# Patient Record
Sex: MALE | Race: BLACK OR AFRICAN AMERICAN
[De-identification: names, ages, dates, MRNs, and addresses within clinical notes are randomized per-mention and may not be internally consistent; named-entity substitution may affect disease eponyms.]

---

## 2017-10-21 ENCOUNTER — HOSPITAL ENCOUNTER (EMERGENCY)
Dept: HOSPITAL 62 - ER | Age: 62
Discharge: HOME | End: 2017-10-21
Payer: COMMERCIAL

## 2017-10-21 VITALS — SYSTOLIC BLOOD PRESSURE: 120 MMHG | DIASTOLIC BLOOD PRESSURE: 80 MMHG

## 2017-10-21 DIAGNOSIS — I25.10: ICD-10-CM

## 2017-10-21 DIAGNOSIS — Z79.899: ICD-10-CM

## 2017-10-21 DIAGNOSIS — R10.9: Primary | ICD-10-CM

## 2017-10-21 DIAGNOSIS — Z87.891: ICD-10-CM

## 2017-10-21 DIAGNOSIS — R11.0: ICD-10-CM

## 2017-10-21 LAB
ALBUMIN SERPL-MCNC: 4.5 G/DL (ref 3.5–5)
ALP SERPL-CCNC: 78 U/L (ref 38–126)
ALT SERPL-CCNC: 36 U/L (ref 21–72)
ANION GAP SERPL CALC-SCNC: 13 MMOL/L (ref 5–19)
AST SERPL-CCNC: 17 U/L (ref 17–59)
BASOPHILS # BLD AUTO: 0.1 10^3/UL (ref 0–0.2)
BASOPHILS NFR BLD AUTO: 1 % (ref 0–2)
BILIRUB DIRECT SERPL-MCNC: 0.4 MG/DL (ref 0–0.4)
BILIRUB SERPL-MCNC: 0.5 MG/DL (ref 0.2–1.3)
BUN SERPL-MCNC: 11 MG/DL (ref 7–20)
CALCIUM: 10.3 MG/DL (ref 8.4–10.2)
CHLORIDE SERPL-SCNC: 105 MMOL/L (ref 98–107)
CK SERPL-CCNC: 134 U/L (ref 55–170)
CO2 SERPL-SCNC: 27 MMOL/L (ref 22–30)
CREAT SERPL-MCNC: 1.05 MG/DL (ref 0.52–1.25)
EOSINOPHIL # BLD AUTO: 0.1 10^3/UL (ref 0–0.6)
EOSINOPHIL NFR BLD AUTO: 2.1 % (ref 0–6)
ERYTHROCYTE [DISTWIDTH] IN BLOOD BY AUTOMATED COUNT: 15.9 % (ref 11.5–14)
GLUCOSE SERPL-MCNC: 95 MG/DL (ref 75–110)
HCT VFR BLD CALC: 42.1 % (ref 37.9–51)
HGB BLD-MCNC: 14 G/DL (ref 13.5–17)
HGB HCT DIFFERENCE: -0.1
LIPASE SERPL-CCNC: 37 U/L (ref 23–300)
LYMPHOCYTES # BLD AUTO: 2.7 10^3/UL (ref 0.5–4.7)
LYMPHOCYTES NFR BLD AUTO: 39.6 % (ref 13–45)
MCH RBC QN AUTO: 26.5 PG (ref 27–33.4)
MCHC RBC AUTO-ENTMCNC: 33.3 G/DL (ref 32–36)
MCV RBC AUTO: 80 FL (ref 80–97)
MONOCYTES # BLD AUTO: 0.6 10^3/UL (ref 0.1–1.4)
MONOCYTES NFR BLD AUTO: 8.6 % (ref 3–13)
NEUTROPHILS # BLD AUTO: 3.3 10^3/UL (ref 1.7–8.2)
NEUTS SEG NFR BLD AUTO: 48.7 % (ref 42–78)
POTASSIUM SERPL-SCNC: 4.5 MMOL/L (ref 3.6–5)
PROT SERPL-MCNC: 8.1 G/DL (ref 6.3–8.2)
RBC # BLD AUTO: 5.28 10^6/UL (ref 4.35–5.55)
SODIUM SERPL-SCNC: 144.6 MMOL/L (ref 137–145)
WBC # BLD AUTO: 6.8 10^3/UL (ref 4–10.5)

## 2017-10-21 PROCEDURE — 80053 COMPREHEN METABOLIC PANEL: CPT

## 2017-10-21 PROCEDURE — 82550 ASSAY OF CK (CPK): CPT

## 2017-10-21 PROCEDURE — 93005 ELECTROCARDIOGRAM TRACING: CPT

## 2017-10-21 PROCEDURE — 85025 COMPLETE CBC W/AUTO DIFF WBC: CPT

## 2017-10-21 PROCEDURE — 83690 ASSAY OF LIPASE: CPT

## 2017-10-21 PROCEDURE — 36415 COLL VENOUS BLD VENIPUNCTURE: CPT

## 2017-10-21 PROCEDURE — 71010: CPT

## 2017-10-21 PROCEDURE — 99284 EMERGENCY DEPT VISIT MOD MDM: CPT

## 2017-10-21 PROCEDURE — 93010 ELECTROCARDIOGRAM REPORT: CPT

## 2017-10-21 PROCEDURE — S0119 ONDANSETRON 4 MG: HCPCS

## 2017-10-21 PROCEDURE — 84484 ASSAY OF TROPONIN QUANT: CPT

## 2017-10-21 NOTE — RADIOLOGY REPORT (SQ)
EXAM DESCRIPTION:  CHEST SINGLE VIEW



COMPLETED DATE/TIME:  10/21/2017 4:14 pm



REASON FOR STUDY:  nausea, h/o MI felt similar



COMPARISON:  2/16/2008



EXAM PARAMETERS:  NUMBER OF VIEWS: One view.

TECHNIQUE: Single frontal radiographic view of the chest acquired.

RADIATION DOSE: NA

LIMITATIONS: None.



FINDINGS:  LUNGS AND PLEURA: No opacities, masses or pneumothorax. No pleural effusion.

MEDIASTINUM AND HILAR STRUCTURES: No masses.  Contour normal.

HEART AND VASCULAR STRUCTURES: Heart normal in size.  Normal vasculature.

BONES: No acute findings.

HARDWARE: Median sternotomy wires noted and intact.  Prior CABG.

OTHER: No other significant finding.



IMPRESSION:  NO ACUTE RADIOGRAPHIC FINDING IN THE CHEST.



TECHNICAL DOCUMENTATION:  JOB ID:  [id number]

## 2017-10-21 NOTE — ER DOCUMENT REPORT
ED Medical Screen (RME)





- General


TRAVEL OUTSIDE OF THE [place] IN LAST 30 DAYS: No





- General


Chief Complaint: Nausea


Stated Complaint: ABDOMINAL ISSUES


Time Seen by Provider: 10/21/17 15:30


Notes: 





Patient is a 62 year old male presenting to the emergency department for nausea 

and dizziness. Patient states he felt like this previously when he had an MI. 

Patient denies any chest pain, difficulty breathing, or vomiting. Patient 

states he had a triple bypass x 10 years ago and his last catheterization was 1 

year ago.  Patient's cardiologist is Dr. Pacheco. (BING SILVA)





- Related Data


Allergies/Adverse Reactions: 


 





No Known Allergies Allergy (Verified 10/21/17 15:06)


 








Home Medications: 


 Current Home Medications





Aspirin [Aspirin EC] 81 mg PO DAILY 10/21/17 [History]


Carvedilol [Coreg] 1 tab PO Q12 10/21/17 [History]


Lisinopril [Prinivil 40 mg Tablet] 20 mg PO DAILY 10/21/17 [History]


Simvastatin [Simvastatin] 40 mg PO DAILY 10/21/17 [History]


Zolpidem Tartrate 10 mg PO QHS PRN 10/21/17 [History]











Past Medical History





- Social History


Chew tobacco use (# tins/day): No


Frequency of alcohol use: None


Drug Abuse: None





- Past Medical History


Cardiac Medical History: Reports: Hx Heart Attack, Hx Hypercholesterolemia, Hx 

Hypertension


Renal/ Medical History: Denies: Hx Peritoneal Dialysis


Past Surgical History: Reports: Hx Coronary Artery Bypass Graft, Hx Open Heart 

Surgery - CABG x 3





- Immunizations


Hx Diphtheria, Pertussis, Tetanus Vaccination: No


History of Influenza Vaccine for 10/2017 - 3/2018 Season: No





Physical Exam





- Vital signs


Vitals: 





 











Temp Pulse Resp BP Pulse Ox


 


 98.6 F   65   20   126/85 H  98 


 


 10/21/17 15:06  10/21/17 15:06  10/21/17 15:06  10/21/17 15:06  10/21/17 15:06














- Notes


Notes: 





GENERAL: Alert, interacts well. No acute distress.


LUNGS: Clear to auscultation bilaterally, no wheezes, rales, or rhonchi. No 

respiratory distress.


HEART: Regular rate and rhythm. No murmurs, gallops, or rubs.


 (BING SILVA)





- Vital Signs


Vital signs: 





 











Temp Pulse Resp BP Pulse Ox


 


 98.6 F   65   20   126/85 H  98 


 


 10/21/17 15:06  10/21/17 15:06  10/21/17 15:06  10/21/17 15:06  10/21/17 15:06














Scribe Documentation





- Scribe


Written by Sudhaibe:: Josie Elizondo 10/21/2017 15:47


acting as scribe for :: [place]

## 2017-10-21 NOTE — ER DOCUMENT REPORT
ED GI/





- General


Chief Complaint: Nausea


Stated Complaint: ABDOMINAL ISSUES


Time Seen by Provider: 10/21/17 15:30


Notes: 


The patient is a 62-year-old male, past medical history CAD status post triple 

bypass 10 years ago, presents with episode of upper abdominal cramping and 

slight nausea that felt like indigestion.  He drank ginger ale and took a Tums 

with relief of his symptoms.  He is concerned because 10 years ago, he had 

similar symptoms and was told he had a heart attack. He is currently completely 

asymptomatic and denies chest pain, shortness of breath, nausea, vomiting, 

current abdominal pain, fevers, diarrhea, constipation or urinary symptoms.


TRAVEL OUTSIDE OF THE [place] IN LAST 30 DAYS: No





- Related Data


Allergies/Adverse Reactions: 


 





No Known Allergies Allergy (Verified 10/21/17 15:06)


 








Home Medications: 


 Current Home Medications





Aspirin [Aspirin EC] 81 mg PO DAILY 10/21/17 [History]


Carvedilol [Coreg] 1 tab PO Q12 10/21/17 [History]


Lisinopril [Prinivil 40 mg Tablet] 20 mg PO DAILY 10/21/17 [History]


Simvastatin [Simvastatin] 40 mg PO DAILY 10/21/17 [History]


Zolpidem Tartrate 10 mg PO QHS PRN 10/21/17 [History]











Past Medical History





- General


Information source: Patient





- Social History


Smoking Status: Former Smoker


Chew tobacco use (# tins/day): No


Frequency of alcohol use: None


Drug Abuse: None


Family History: Reviewed & Not Pertinent





- Past Medical History


Cardiac Medical History: Reports: Hx Heart Attack, Hx Hypercholesterolemia, Hx 

Hypertension


Renal/ Medical History: Denies: Hx Peritoneal Dialysis


Past Surgical History: Reports: Hx Coronary Artery Bypass Graft, Hx Open Heart 

Surgery - CABG x 3





- Immunizations


Hx Diphtheria, Pertussis, Tetanus Vaccination: No





Review of Systems





- Review of Systems


Notes: 


REVIEW OF SYSTEMS:


CONSTITUTIONAL: -fevers, -chills


EENT: -eye pain, -difficulty swallowing, -nasal congestion


CARDIOVASCULAR:-chest pain, -syncope.


RESPIRATORY: -cough, -SOB


GASTROINTESTINAL: +abdominal pain, +nausea, -vomiting, -diarrhea


GENITOURINARY: -dysuria, -hematuria


MUSCULOSKELETAL: -back pain, -neck pain


SKIN: -rash or skin lesions.


HEMATOLOGIC: -easy bruising or bleeding.


LYMPHATIC: -swollen, enlarged glands.


NEUROLOGICAL: -altered mental status or loss of consciousness, -headache, -

neurologic symptoms


PSYCHIATRIC: -anxiety, -depression.


ALL OTHER SYSTEMS REVIEWED AND NEGATIVE.





Physical Exam





- Vital signs


Vitals: 


 











Temp Pulse Resp BP Pulse Ox


 


 98.6 F   65   20   126/85 H  98 


 


 10/21/17 15:06  10/21/17 15:06  10/21/17 15:06  10/21/17 15:06  10/21/17 15:06














- Notes


Notes: 


PHYSICAL EXAMINATION:





GENERAL: Well-appearing, well-nourished and in no acute distress.





HEAD: Atraumatic, normocephalic.





EYES: Pupils equal round and reactive to light, extraocular movements intact, 

sclera anicteric, conjunctiva are normal.





ENT: nares patent, oropharynx clear without exudates.  Moist mucous membranes.





NECK: Normal range of motion, supple without lymphadenopathy





LUNGS: Breath sounds clear to auscultation bilaterally and equal.  No wheezes 

rales or rhonchi.





HEART: Regular rate and rhythm without murmurs





ABDOMEN: Soft, nontender, normoactive bowel sounds.  No guarding, no rebound.  

No masses appreciated.





EXTREMITIES: Normal range of motion, no pitting or edema.  No cyanosis.





NEUROLOGICAL: Cranial nerves grossly intact.  Normal speech, normal gait.  

Normal sensory and motor exams.





PSYCH: Normal mood, normal affect.





SKIN: Warm, Dry, normal turgor, no rashes or lesions noted.





Course





- Re-evaluation


Re-evalutation: 


Patient appears very well.  He has absolutely no abdominal tenderness and his 

EKG and labs are completely unremarkable.  There is no sign of active ischemia 

and he is drinking without any nausea or vomiting.  Instructed patient to follow

-up with his primary care physician for further evaluation and treatment.





- Vital Signs


Vital signs: 


 











Temp Pulse Resp BP Pulse Ox


 


 98.6 F   65   15   119/80   97 


 


 10/21/17 15:06  10/21/17 15:06  10/21/17 18:01  10/21/17 18:01  10/21/17 18:01














- Laboratory


Result Diagrams: 


 10/21/17 17:40





 10/21/17 17:40


Laboratory results interpreted by me: 


 











  10/21/17 10/21/17





  17:40 17:40


 


MCH  26.5 L 


 


RDW  15.9 H 


 


Calcium   10.3 H














- EKG Interpretation by Me


EKG shows normal: Sinus rhythm, Axis, Intervals, QRS Complexes, ST-T Waves


Rate: Normal


Axis/QRS: RBBB, LAHB/LAFB


When compared to previous EKG there are: No significant change





Discharge





- Discharge


Clinical Impression: 


 Abdominal cramping





Condition: Stable


Disposition: HOME, SELF-CARE


Additional Instructions: 


ABDOMINAL PAIN:





     There are many causes of abdominal pain.  Pain can mean a serious problem 

requiring surgery (such as appendicitis). It can also be an innocent problem 

that goes away on its own (such as a viral infection).  Often, time must pass 

to determine the cause of pain.


     The physician does not feel that hospitalization is necessary, at present. 

Things may change within the next 24 hours. Call the doctor or come back for re-

examination if any problems occur, such as:


     (1) Pain that becomes more severe, steady, or becomes concentrated in one 

specific area.  Also, pain that is more severe with movement or coughing.  


     (2) Vomiting that persists or becomes more frequent.  


     (3) Blood in the vomitus, urine, or bowel movements.  Blood in the stool 

may have a tarry or black appearance.


     (4) Shaking chills or fever greater than 100 degrees F. 


     (5) The abdomen becomes more distended or swollen. 


     (6) Bowel movements cease. 


     (7) Failure to improve as expected.








NORMAL EXAM AND WORKUP:


     At this time, your examination and workup show no significant abnormality.

  No significant abnormal physical findings are noted.  All laboratory, EKG, 

and imaging (x-ray, CT scans, ultrasound) studies that were ordered show no 

significant abnormality.


     Although your examination and all studies that were ordered showed no 

significant abnormal finding, there are no examinations and no studies that are 

100% accurate.  There is always the possibility that some abnormality could 

exist and not be detected with physical examination or within the limits and 

capabilities of laboratory and other studies.


     You should return or follow up as you were instructed on your visit today 

for further evaluation if your symptoms do not resolve.





FOLLOW-UP CARE:


If you have been referred to a physician for follow-up care, call the physician

s office for an appointment as you were instructed or within the next two days.

  If you experience worsening or a significant change in your symptoms, notify 

the physician immediately or return to the Emergency Department at any time for 

re-evaluation.





CHEST PAIN OF UNCLEAR CAUSE:





     The exact cause of your chest pain isn't clear.  Fortunately, there is no 

evidence of a dangerous medical condition.  Further testing may be required to 

find the source of the pain.


     Most often, we find that this pain is coming from the chest wall -- the 

muscles or rib joints in the chest.  But chest pain can come from the lung and 

lung lining, the esophagus, the heart valves or heart lining, and even the 

stomach or gallbladder.


     Rest.  Eat lightly until the pain is gone.  We may prescribe medicine for 

pain and inflammation.


     You should call the physician immediately if the pain radiates to the 

shoulder, jaw or arms; if you start to run a fever or develop a cough; or if 

you develop shortness of breath, or other new or alarming symptoms.








NORMAL EXAM AND WORKUP:


     At this time, your examination and workup show no significant abnormality.

  No significant abnormal physical findings were noted.  All laboratory, EKG, 

and imaging (x-ray, CT scans, ultrasound) studies that were ordered show no 

significant abnormality.


     Although your examination and all studies that were ordered showed no 

significant abnormal finding, there are no examinations and no studies that are 

100% accurate.  There is always the possibility that some abnormality could 

exist and not be detected with physical examination or within the limits and 

capabilities of laboratory and other studies.


     You should return or follow up as you were instructed on your visit today 

for further evaluation if your symptoms do not resolve.








CHEST WALL PAIN:


     Your chest pain may be coming from the chest wall. This is often caused by 

straining the muscles or joints in the chest during physical activity, direct 

trauma, coughing, or vigorous vomiting.  Persons with arthritis are especially 

prone to this type of pain, due to inflammation of the cartilage joints near 

the breast bone. Occasionally, no cause can be found.


     Rest from strenuous physical activity.  This kind of chest pain is usually 

made worse by movement of the chest.  Depending on the symptoms, we may 

prescribe medicine for pain, muscle relaxation, and antiinflammatory effects.


     If the pain is new, and seems to be due to muscle strain, cold packs can 

help.  Otherwise, apply gentle warmth to the painful area for 15 minutes every 

hour or two.


     You should call contact the doctor immediately if things change. Further 

evaluation is needed if you develop a fever or cough, if the nature of the pain 

changes, or if you become short of breath.








ANGINA EPISODE:


     Your physician has diagnosed the pain you experienced as an episode of 

angina.  Angina occurs when a portion of the heart muscle temporarily lacks 

oxygen.  It does not cause any permanent heart damage, but serves as a warning.


     Hospitalization is not necessary now.  Evaluation of your cardiac condition

, and medical therapy for angina will be necessary.  It's important you be sure 

to keep all appointments and take medication exactly as prescribed.


     Angina is usually treated with a type of "nitrate" medication. This is 

available as ointment, pills, or sublingual (under the tongue) tablets.  

Depending on your clinical situation, other medications may be added to help 

control angina.  These may include beta blockers or calcium blockers.


     If episodes of angina are occurring with increased frequency, or if chest 

pain lasts longer than 15 minutes or does not respond to nitroglycerin, you 

must seek emergency medical care immediately.








ACID REFLUX DISEASE (GERD):


     Gastro-Esophageal Reflux Disease (GERD) is caused by stomach acid 

refluxing back up into the esophagus. The valve at the end of the esophagus may 

be weak. This is common in persons with a hiatal hernia. GERD symptoms can 

include indigestion, chest pain, heartburn, or food "sticking." Certain foods, 

alcohol, and aspirin can make GERD worse.


     Treatment depends on the severity. Usually, antacids or acid-suppressing 

medicines are used. When the esophagus is acutely inflamed, the physician will 

often prescribe membrane-protective drugs such as Carafate.  Some patients 

benefit from medication such as Reglan that tightens the valve at the top of 

the stomach.


     Avoid those foods that bring on your symptoms. For many people, these 

foods are coffee, chocolate, onions, garlic, and carbonated drinks. Don't use 

alcohol, aspirin, caffeine, or tobacco. Don't eat late at night -- within 4 

hours of bedtime. Don't over-eat. If necessary, elevate the head of your bed 

about 4 inches so that stomach acid will not roll up into your esophagus.


     Call the doctor if you develop severe chest pain, inability to swallow 

fluids, fever, or worsening symptoms.








ANTACID THERAPY:


     You have been instructed to start antacid therapy.  Antacids directly 

neutralize stomach acid.  This is useful for acid irritation of the esophagus, 

gastritis, and ulcers.


     You should take two tablespoons of antacid one hour after each meal and 

three hours after each meal.  If you are not eating, take the antacid every two 

hours.  If you are using a concentrate (such as Maalox TC), use only one 

tablespoon.


     Many antacids affect the bowels.  The most common problem is diarrhea.  In 

this case, a pure aluminum hydroxide antacid (such as AlternaGel) can be 

substituted for some or all doses.  If the problem is constipation, add a 

teaspoon of Milk of Magnesia to each dose.


     Call the doctor if you experience continued diarrhea or constipation, or 

if you develop lightheadedness, bloody stool or vomitus, severe abdominal pain, 

or black stool.





FOLLOW-UP CARE:


If you have been referred to a physician for follow-up care, call the physician

s office for an appointment as you were instructed or within the next two days.

  If you experience worsening or a significant change in your symptoms, notify 

the physician immediately or return to the Emergency Department at any time for 

re-evaluation.

## 2017-10-22 NOTE — EKG REPORT
SEVERITY:- ABNORMAL ECG -

SINUS RHYTHM

VENTRICULAR PREMATURE COMPLEX

RBBB AND LPFB

INFERIOR INFARCT, AGE INDETERMINATE

:

Confirmed by: Luz Hess MD 22-Oct-2017 09:19:22

## 2018-01-12 ENCOUNTER — HOSPITAL ENCOUNTER (OUTPATIENT)
Dept: HOSPITAL 62 - RAD | Age: 63
End: 2018-01-12
Attending: PHYSICIAN ASSISTANT
Payer: COMMERCIAL

## 2018-01-12 DIAGNOSIS — Z95.1: ICD-10-CM

## 2018-01-12 DIAGNOSIS — Z72.0: ICD-10-CM

## 2018-01-12 DIAGNOSIS — Z12.2: Primary | ICD-10-CM

## 2018-01-12 PROCEDURE — G0297 LDCT FOR LUNG CA SCREEN: HCPCS

## 2018-01-12 NOTE — RADIOLOGY REPORT (SQ)
EXAM DESCRIPTION:  CT LUNG CANCER SCREENING



COMPLETED DATE/TIME:  1/12/2018 8:43 am



REASON FOR STUDY:  Z72.0 TOBACCO USE Z72.0  TOBACCO USE

Has the patient had a Chest CT scan within the past year? No

Was the patient offered tobacco cessation counseling? Yes

Was the patient engaged in shared decision making for this test? Yes

Does the patient have signs or symptoms of Lung Cancer? No

Is the patient a smoker? No

How many packs per year? 365

How many years since quitting smoking? More than 5 years

Patients age: 62



COMPARISON:  None.



TECHNIQUE:  Low Dose CT scan performed of the chest without intravenous contrast for purposes of scre
ening for lung cancer.  Images reviewed with lung, soft tissue and bone windows.  Reconstructed coron
al and sagittal MPR images reviewed.  All images stored on PACS.

All CT scanners at this facility use dose modulation, iterative reconstruction, and/or weight based d
osing when appropriate to reduce radiation dose to as low as reasonably achievable (ALARA).

[facility]: Dose Right  [facility]: CareDose    [facility]: Dose Right    [facility]: Teradose 4D    [facility]: Smart Technologies



RADIATION DOSE:  2.8  mGy.

 .



LIMITATIONS:  None



FINDINGS:  LUNGS AND PLEURA: No masses or nodules.    No pleural effusions or calcifications.  No pne
umothorax.   No scarring or interstitial changes.

HILAR AND MEDIASTINAL STRUCTURES: No identified masses. No abnormal nodes.

HEART AND VASCULAR STRUCTURES: No aortic aneurysm.  No pericardial effusion.   No cardiac devices.

CORONARY ARTERY CALCIFICATIONS: No significant calcifications.  Patient is post CABG

UPPER ABDOMEN, THYROID, BONES, OTHER SOFT TISSUES: No significant findings.



IMPRESSION:  NO SIGNIFICANT FINDING IN THE LUNGS ON NON-CONTRASTED CHEST CT.

NO OTHER CLINICALLY SIGNIFICANT/POTENTIALLY CLINICALLY SIGNIFICANT FINDINGS



LUNGRADS:  LUNGRADS: 1 NEGATIVE.  NO NODULES, OR DEFINITELY BENIGN NODULES

MODIFIER: NONE



RECOMMENDATION:  Continue annual screening with LDCT in 12 months.



COMMENT:  CRITERIA:

No lung nodules.

Nodules with specific calcifications:  Complete, central, popcorn, concentric rings and fat containin
g nodules.



TECHNICAL DOCUMENTATION:  JOB ID:  [id number]

Quality ID # [id number]: Final reports with documentation of one or more dose reduction techniques (e.g., Au
tomated exposure control, adjustment of the mA and/or kV according to patient size, use of iterative 
reconstruction technique)

 2011 [facility]

## 2019-10-09 ENCOUNTER — HOSPITAL ENCOUNTER (OUTPATIENT)
Dept: HOSPITAL 62 - OD | Age: 64
End: 2019-10-09
Attending: FAMILY MEDICINE
Payer: COMMERCIAL

## 2019-10-09 DIAGNOSIS — E87.5: Primary | ICD-10-CM

## 2019-10-09 LAB
ANION GAP SERPL CALC-SCNC: 7 MMOL/L (ref 5–19)
BUN SERPL-MCNC: 10 MG/DL (ref 7–20)
CALCIUM: 9.4 MG/DL (ref 8.4–10.2)
CHLORIDE SERPL-SCNC: 105 MMOL/L (ref 98–107)
CO2 SERPL-SCNC: 30 MMOL/L (ref 22–30)
GLUCOSE SERPL-MCNC: 99 MG/DL (ref 75–110)
POTASSIUM SERPL-SCNC: 4.8 MMOL/L (ref 3.6–5)

## 2019-10-09 PROCEDURE — 80048 BASIC METABOLIC PNL TOTAL CA: CPT

## 2019-10-09 PROCEDURE — 36415 COLL VENOUS BLD VENIPUNCTURE: CPT

## 2019-12-17 ENCOUNTER — HOSPITAL ENCOUNTER (EMERGENCY)
Dept: HOSPITAL 62 - ER | Age: 64
Discharge: LEFT BEFORE DISCHARGE | End: 2019-12-17
Payer: MEDICARE

## 2019-12-17 VITALS — DIASTOLIC BLOOD PRESSURE: 84 MMHG | SYSTOLIC BLOOD PRESSURE: 150 MMHG

## 2019-12-17 DIAGNOSIS — Z53.21: Primary | ICD-10-CM

## 2019-12-17 PROCEDURE — 93010 ELECTROCARDIOGRAM REPORT: CPT

## 2019-12-17 PROCEDURE — 93005 ELECTROCARDIOGRAM TRACING: CPT

## 2019-12-17 NOTE — EKG REPORT
SEVERITY:- ABNORMAL ECG -

SINUS RHYTHM

RBBB AND LPFB

INFERIOR INFARCT, AGE INDETERMINATE

:

Confirmed by: Denisse Scales 17-Dec-2019 14:23:19

## 2019-12-31 ENCOUNTER — HOSPITAL ENCOUNTER (OUTPATIENT)
Dept: HOSPITAL 62 - ER | Age: 64
Setting detail: OBSERVATION
LOS: 3 days | Discharge: HOME | End: 2020-01-03
Attending: INTERNAL MEDICINE | Admitting: FAMILY MEDICINE
Payer: MEDICARE

## 2019-12-31 DIAGNOSIS — K21.9: ICD-10-CM

## 2019-12-31 DIAGNOSIS — Z95.1: ICD-10-CM

## 2019-12-31 DIAGNOSIS — Z87.891: ICD-10-CM

## 2019-12-31 DIAGNOSIS — I10: ICD-10-CM

## 2019-12-31 DIAGNOSIS — I25.2: ICD-10-CM

## 2019-12-31 DIAGNOSIS — Z79.899: ICD-10-CM

## 2019-12-31 DIAGNOSIS — R79.89: ICD-10-CM

## 2019-12-31 DIAGNOSIS — Z79.82: ICD-10-CM

## 2019-12-31 DIAGNOSIS — R07.9: ICD-10-CM

## 2019-12-31 DIAGNOSIS — R10.13: Primary | ICD-10-CM

## 2019-12-31 DIAGNOSIS — E78.5: ICD-10-CM

## 2019-12-31 DIAGNOSIS — E66.9: ICD-10-CM

## 2019-12-31 DIAGNOSIS — R01.1: ICD-10-CM

## 2019-12-31 DIAGNOSIS — I25.10: ICD-10-CM

## 2019-12-31 LAB
ADD MANUAL DIFF: NO
ALBUMIN SERPL-MCNC: 3.6 G/DL (ref 3.5–5)
ALP SERPL-CCNC: 78 U/L (ref 38–126)
ANION GAP SERPL CALC-SCNC: 7 MMOL/L (ref 5–19)
AST SERPL-CCNC: 18 U/L (ref 17–59)
BASOPHILS # BLD AUTO: 0.1 10^3/UL (ref 0–0.2)
BASOPHILS NFR BLD AUTO: 0.7 % (ref 0–2)
BILIRUB DIRECT SERPL-MCNC: 0.3 MG/DL (ref 0–0.4)
BILIRUB SERPL-MCNC: 0.3 MG/DL (ref 0.2–1.3)
BUN SERPL-MCNC: 12 MG/DL (ref 7–20)
CALCIUM: 9 MG/DL (ref 8.4–10.2)
CHLORIDE SERPL-SCNC: 102 MMOL/L (ref 98–107)
CK SERPL-CCNC: 114 U/L (ref 55–170)
CO2 SERPL-SCNC: 35 MMOL/L (ref 22–30)
EOSINOPHIL # BLD AUTO: 0.1 10^3/UL (ref 0–0.6)
EOSINOPHIL NFR BLD AUTO: 1.5 % (ref 0–6)
ERYTHROCYTE [DISTWIDTH] IN BLOOD BY AUTOMATED COUNT: 16.6 % (ref 11.5–14)
GLUCOSE SERPL-MCNC: 146 MG/DL (ref 75–110)
HCT VFR BLD CALC: 37.2 % (ref 37.9–51)
HGB BLD-MCNC: 12.1 G/DL (ref 13.5–17)
LYMPHOCYTES # BLD AUTO: 2.6 10^3/UL (ref 0.5–4.7)
LYMPHOCYTES NFR BLD AUTO: 28.3 % (ref 13–45)
MCH RBC QN AUTO: 25.8 PG (ref 27–33.4)
MCHC RBC AUTO-ENTMCNC: 32.6 G/DL (ref 32–36)
MCV RBC AUTO: 79 FL (ref 80–97)
MONOCYTES # BLD AUTO: 0.8 10^3/UL (ref 0.1–1.4)
MONOCYTES NFR BLD AUTO: 8.1 % (ref 3–13)
NEUTROPHILS # BLD AUTO: 5.7 10^3/UL (ref 1.7–8.2)
NEUTS SEG NFR BLD AUTO: 61.4 % (ref 42–78)
PLATELET # BLD: 202 10^3/UL (ref 150–450)
POTASSIUM SERPL-SCNC: 3.8 MMOL/L (ref 3.6–5)
PROT SERPL-MCNC: 6.9 G/DL (ref 6.3–8.2)
RBC # BLD AUTO: 4.71 10^6/UL (ref 4.35–5.55)
TOTAL CELLS COUNTED % (AUTO): 100 %
WBC # BLD AUTO: 9.3 10^3/UL (ref 4–10.5)

## 2019-12-31 PROCEDURE — 80076 HEPATIC FUNCTION PANEL: CPT

## 2019-12-31 PROCEDURE — 85025 COMPLETE CBC W/AUTO DIFF WBC: CPT

## 2019-12-31 PROCEDURE — 36415 COLL VENOUS BLD VENIPUNCTURE: CPT

## 2019-12-31 PROCEDURE — 80048 BASIC METABOLIC PNL TOTAL CA: CPT

## 2019-12-31 PROCEDURE — G0378 HOSPITAL OBSERVATION PER HR: HCPCS

## 2019-12-31 PROCEDURE — 82553 CREATINE MB FRACTION: CPT

## 2019-12-31 PROCEDURE — 84484 ASSAY OF TROPONIN QUANT: CPT

## 2019-12-31 PROCEDURE — 74177 CT ABD & PELVIS W/CONTRAST: CPT

## 2019-12-31 PROCEDURE — 99285 EMERGENCY DEPT VISIT HI MDM: CPT

## 2019-12-31 PROCEDURE — 71045 X-RAY EXAM CHEST 1 VIEW: CPT

## 2019-12-31 PROCEDURE — 82550 ASSAY OF CK (CPK): CPT

## 2019-12-31 PROCEDURE — 93005 ELECTROCARDIOGRAM TRACING: CPT

## 2019-12-31 PROCEDURE — 76700 US EXAM ABDOM COMPLETE: CPT

## 2019-12-31 PROCEDURE — 80053 COMPREHEN METABOLIC PANEL: CPT

## 2019-12-31 PROCEDURE — 93010 ELECTROCARDIOGRAM REPORT: CPT

## 2019-12-31 NOTE — RADIOLOGY REPORT (SQ)
XR CHEST 1 VIEW



EXAM DATE: 12/31/2019 10:52 PM CST



HISTORY: Epigastric pain; hx of MI.



COMPARISON: 10/21/2017



FINDINGS:



Stable heart size with prior CABG noted. No pulmonary edema. The

lungs are clear. No pleural effusions or pneumothorax. No acute

bony findings are seen.



IMPRESSION:



No evidence of acute cardiopulmonary disease.

## 2019-12-31 NOTE — ER DOCUMENT REPORT
ED Medical Screen (RME)





- General


Chief Complaint: Epigastric Pain


Stated Complaint: EPIGASTRIC PAIN


Time Seen by Provider: 12/31/19 22:47


Primary Care Provider: 


JENNYFER MENESES MD [Primary Care Provider] - Follow up as needed


Notes: 





Patient is a 64-year-old male who presents to the emergency department with a 

chief complaint of epigastric pain.  Patient has history of of an MI in the 

past.  His symptoms started around 2100 this evening.  He makes some baking soda

and water and it helped with the symptoms.





Exam: S1, S2.





I have greeted and performed a rapid initial assessment of this patient.  A 

comprehensive ED assessment and evaluation of the patient, analysis of test 

results and completion of medical decision making process will be conducted by 

an additional ED providers.


TRAVEL OUTSIDE OF THE [place] IN LAST 30 DAYS: No





- Related Data


Allergies/Adverse Reactions: 


                                        





No Known Allergies Allergy (Verified 10/21/17 15:06)


   








Home Medications: omeprazole 20 mg qam.  lisinopril 20 mg qday.  ezetimibe 10 mg

qday.  atoravastatin 40 mg po qday.  coreg 25 mg po qday.  asa 81 mg po qday





Past Medical History





- Past Medical History


Cardiac Medical History: Reports: Hx Heart Attack, Hx Hypercholesterolemia, Hx 

Hypertension


Renal/ Medical History: Denies: Hx Peritoneal Dialysis


Past Surgical History: Reports: Hx Coronary Artery Bypass Graft, Hx Open Heart 

Surgery - CABG x 3





- Immunizations


Hx Diphtheria, Pertussis, Tetanus Vaccination: No





Physical Exam





- Vital signs


Vitals: 





                                        











Temp Pulse Resp BP Pulse Ox


 


 98.3 F   62   18   138/69 H  96 


 


 12/31/19 22:08  12/31/19 22:08  12/31/19 22:08  12/31/19 22:08  12/31/19 22:08














Course





- Vital Signs


Vital signs: 





                                        











Temp Pulse Resp BP Pulse Ox


 


 98.3 F   62   18   138/69 H  96 


 


 12/31/19 22:08  12/31/19 22:08  12/31/19 22:08  12/31/19 22:08  12/31/19 22:08














Doctor's Discharge





- Discharge


Referrals: 


JENNYFER MENESES MD [Primary Care Provider] - Follow up as needed

## 2020-01-01 LAB
ALBUMIN SERPL-MCNC: 3.6 G/DL (ref 3.5–5)
ALP SERPL-CCNC: 80 U/L (ref 38–126)
ANION GAP SERPL CALC-SCNC: 5 MMOL/L (ref 5–19)
AST SERPL-CCNC: 17 U/L (ref 17–59)
BILIRUB DIRECT SERPL-MCNC: 0.2 MG/DL (ref 0–0.4)
BILIRUB SERPL-MCNC: 0.3 MG/DL (ref 0.2–1.3)
BUN SERPL-MCNC: 11 MG/DL (ref 7–20)
CALCIUM: 9.1 MG/DL (ref 8.4–10.2)
CHLORIDE SERPL-SCNC: 106 MMOL/L (ref 98–107)
CK MB SERPL-MCNC: 1.44 NG/ML (ref ?–4.55)
CK MB SERPL-MCNC: 1.5 NG/ML (ref ?–4.55)
CO2 SERPL-SCNC: 33 MMOL/L (ref 22–30)
GLUCOSE SERPL-MCNC: 99 MG/DL (ref 75–110)
POTASSIUM SERPL-SCNC: 4.1 MMOL/L (ref 3.6–5)
PROT SERPL-MCNC: 6.8 G/DL (ref 6.3–8.2)
TROPONIN I SERPL-MCNC: 0.05 NG/ML
TROPONIN I SERPL-MCNC: 0.05 NG/ML

## 2020-01-01 RX ADMIN — ENOXAPARIN SODIUM SCH MG: 40 INJECTION SUBCUTANEOUS at 13:16

## 2020-01-01 RX ADMIN — LISINOPRIL SCH MG: 10 TABLET ORAL at 16:30

## 2020-01-01 RX ADMIN — CARVEDILOL SCH MG: 12.5 TABLET, FILM COATED ORAL at 21:52

## 2020-01-01 RX ADMIN — PANTOPRAZOLE SODIUM SCH MG: 40 TABLET, DELAYED RELEASE ORAL at 13:16

## 2020-01-01 RX ADMIN — ASPIRIN SCH MG: 81 TABLET, COATED ORAL at 13:16

## 2020-01-01 RX ADMIN — ATORVASTATIN CALCIUM SCH MG: 80 TABLET, FILM COATED ORAL at 21:52

## 2020-01-01 NOTE — EKG REPORT
SEVERITY:- ABNORMAL ECG -

SINUS RHYTHM

RBBB AND LPFB

INFERIOR INFARCT, OLD SINCE [id number].

:

Confirmed by: Greg Ying MD 01-Jan-2020 09:10:24

## 2020-01-01 NOTE — ER DOCUMENT REPORT
ED General





- General


Chief Complaint: Epigastric Pain


Stated Complaint: EPIGASTRIC PAIN


Time Seen by Provider: 12/31/19 22:47


Mode of Arrival: Ambulatory


Information source: Patient


Notes: 





64-year-old male presented to ED for complaint of epigastric/chest pain.  He 

states he was in his lower chest upper abdomen.  He states he has a history of 

MI with a CABG her blood pressure and cholesterol and reflux.  He states his 

pain started about 9:00 and he ate last about 6:00.  He states he took some 

baking soda water and it helps some.  He states he was given aspirin when he 

came to the emergency room.  He states he was concerned because he does have a 

cardiac history and is got another cardiac appointment in January.  He states he

is not having any pain at this time.  He states he had a similar pain about a 

week ago and he became concerned because this is the second time he had this 

pain.  Patient is alert oriented respirations regular nonlabored speaking in 

full sentences.  Patient has a negative troponin earlier and I will get the 

second troponin if it is negative I will send him home is he is no longer having

any pain or discomfort.


TRAVEL OUTSIDE OF THE [place] IN LAST 30 DAYS: No





- HPI


Onset: This evening


Onset/Duration: Sudden, Gone


Quality of pain: No pain


Severity: None


Pain Level: Denies


Associated symptoms: Chest pain - /Epigastric pain


Exacerbated by: Denies


Relieved by: Denies


Similar symptoms previously: Yes


Recently seen / treated by doctor: No





- Related Data


Allergies/Adverse Reactions: 


                                        





No Known Allergies Allergy (Verified 10/21/17 15:06)


   








Home Medications: omeprazole 20 mg qam.  lisinopril 20 mg qday.  ezetimibe 10 mg

qday.  atoravastatin 40 mg po qday.  coreg 25 mg po qday.  asa 81 mg po qday





Past Medical History





- General


Information source: Patient





- Social History


Smoking Status: Former Smoker


Cigarette use (# per day): No


Frequency of alcohol use: None


Drug Abuse: None


Family History: Reviewed & Not Pertinent


Patient has suicidal ideation: No


Patient has homicidal ideation: No





- Past Medical History


Cardiac Medical History: Reports: Hx Heart Attack, Hx Hypercholesterolemia, Hx 

Hypertension


Pulmonary Medical History: Reports: None


EENT Medical History: Reports: None


Neurological Medical History: Reports: None


Endocrine Medical History: Reports: Other - boderline dm


Renal/ Medical History: Reports: None


Malignancy Medical History: Reports None


GI Medical History: Reports: Hx Gastroesophageal Reflux Disease


Musculoskeletal Medical History: Reports None


Skin Medical History: Reports None


Psychiatric Medical History: Reports: None


Traumatic Medical History: Reports: None


Infectious Medical History: Reports: None


Past Surgical History: Reports: Hx Coronary Artery Bypass Graft, Hx Open Heart 

Surgery - CABG x 3





- Immunizations


Hx Diphtheria, Pertussis, Tetanus Vaccination: No





Review of Systems





- Review of Systems


Constitutional: No symptoms reported


EENT: No symptoms reported


Cardiovascular: Chest pain


Respiratory: No symptoms reported


Gastrointestinal: No symptoms reported


Genitourinary: No symptoms reported


Male Genitourinary: No symptoms reported


Musculoskeletal: No symptoms reported


Skin: No symptoms reported


Hematologic/Lymphatic: No symptoms reported


Neurological/Psychological: No symptoms reported





Physical Exam





- Vital signs


Vitals: 


                                        











Temp Pulse Resp BP Pulse Ox


 


 98.3 F   62   18   138/69 H  96 


 


 12/31/19 22:08  12/31/19 22:08  12/31/19 22:08  12/31/19 22:08  12/31/19 22:08











Interpretation: Normal





- General


General appearance: Appears well, Alert





- HEENT


Head: Normocephalic, Atraumatic


Eyes: Normal


Pupils: PERRL





- Respiratory


Respiratory status: No respiratory distress


Chest status: Nontender


Breath sounds: Normal


Chest palpation: Normal





- Cardiovascular


Rhythm: Regular


Heart sounds: Normal auscultation


Murmur: No





- Abdominal


Inspection: Normal


Distension: No distension


Bowel sounds: Hyperactive


Tenderness: Nontender.  No: Tender


Organomegaly: No organomegaly





- Back


Back: Normal, Nontender





- Extremities


General upper extremity: Normal inspection, Nontender, Normal color, Normal ROM,

Normal temperature


General lower extremity: Normal inspection, Nontender, Normal color, Normal ROM,

Normal temperature, Normal weight bearing.  No: Bridgett's sign





- Neurological


Neuro grossly intact: Yes


Cognition: Normal


Orientation: AAOx4


Victoriano Coma Scale Eye Opening: Spontaneous


Victoriano Coma Scale Verbal: Oriented


Ector Coma Scale Motor: Obeys Commands


Victoriano Coma Scale Total: 15


Speech: Normal


Motor strength normal: LUE, RUE, LLE, RLE


Sensory: Normal





- Psychological


Associated symptoms: Normal affect, Normal mood





- Skin


Skin Temperature: Warm


Skin Moisture: Dry


Skin Color: Normal





Course





- Re-evaluation


Re-evalutation: 





01/01/20 05:36


Troponin elevated at 0.049 for second level.  First level was negative.  Patient

denies any pain at this time.  Spoke with Dr. Nelson who stated to put the 

patient on IMCU observation under Dr. Malave.  Patient does have a cardiac 

history with an MI and CABG.  Dr. Eddy recommended 1 inch Nitropaste at this 

time





- Vital Signs


Vital signs: 


                                        











Temp Pulse Resp BP Pulse Ox


 


 97.9 F   67   19   134/88 H  97 


 


 01/01/20 06:11  01/01/20 05:56  01/01/20 08:01  01/01/20 08:01  01/01/20 08:01














- Laboratory


Result Diagrams: 


                                 12/31/19 22:25





                                 01/01/20 04:35


Laboratory results interpreted by me: 


                                        











  12/31/19 12/31/19 01/01/20





  22:25 22:25 04:35


 


Hgb  12.1 L  


 


Hct  37.2 L  


 


MCV  79 L  


 


MCH  25.8 L  


 


RDW  16.6 H  


 


Carbon Dioxide   35 H  33 H


 


Creatinine   1.28 H 


 


Est GFR (MDRD) Non-Af   56 L 


 


Glucose   146 H 














- Diagnostic Test


Radiology reviewed: Image reviewed, Reports reviewed





Discharge





- Discharge


Clinical Impression: 


Chest pain


Qualifiers:


 Chest pain type: unspecified Qualified Code(s): R07.9 - Chest pain, unspecified





Condition: Stable


Disposition: ADMITTED AS OBSERVATION


Admitting Provider: Ananda - Spoke with Leslie


Unit Admitted: [facility]

## 2020-01-01 NOTE — PDOC CONSULTATION
Consultation-Blank


Consultation: 





CARDIOLOGY CONSULTATION by Dr. Luz Hess on 1/1/2020.  Patient seen at 

6:30 PM on 1/1/2020.  60 minutes spent with patient more than 50% of time spent 

in direct patient care.





Reason for consultation: Patient with history of coronary artery disease prior 

MI and history of coronary bypass graft surgery admitted with prolonged 

epigastric pain.





CONSULT REQUESTING PHYSICIAN: Dr. Nelson.





HISTORY OF PRESENT ILLNESS: Patient is a 64-year-old Afro-American male with 

known history of hypertension, history of coronary artery disease with remote 

history of myocardial infarction and history of PTCA of the right coronary 

artery prior to 2001.  In 2001 the patient had another MI and had coronary 

artery bypass graft surgery x3 very and he had a LIMA placed to the LAD, and 

reverse saphenous vein graft.  Placed to the obtuse marginal 1 branch and is 

reverse saphenous vein graft to the distal right coronary artery.  The patient 

had remained stable and his last stress test was done in 2018 in the middle 

months.  And this was negative for ischemia, but there was evidence of  inferior

wall MI..  He also had an echo which showed normal LV ejection fraction.  The 

patient states since the past few days off-and-on he has been having epigastric 

pressure-like pain with nausea and relief with drinking baking soda and water.  

The patient states that it starts off with the him eating greasy foods..  He 

this is not related to exertion.  He states that he has these symptoms 

consistently when he takes fatty foods.  Last night she had some fatty food and 

subsequently started having epigastric pain which is dull pressure-like 

sensation without radiation.  It was associated with nausea.  But there is no vo

miting.  There is no diaphoresis or shortness of breath.  There was no 

radiation.  The patient states he had taken some baking soda in water and by the

time he came to the emergency room his pain was relieved after he belched.  He 

states that consistently this pain is relieved by him belching.  And the patient

does give a history of fatty food intolerance.  There is no shortness of breath.

 There is no diaphoresis no PND orthopnea or palpitations or near syncope or 

syncope.  There is no cough wheezing or sputum production.  He states in the 

past he has been worked up for obstructive sleep apnea and had a negative sleep 

study.  He has no history of asthma or COPD.





Past Medical History


Cardiac Medical History: Reports: Myocardial Infarction, Hyperlipidema, 

Hypertension


Pulmonary Medical History: Reports: None


EENT Medical History: Reports: None


Neurological Medical History: Reports: None


Endocrine Medical History: Reports: Other - boderline dm


Renal/ Medical History: Reports: None


Malignancy Medical History: Reports: None


GI Medical History: Reports: Gastroesophageal Reflux Disease


Musculoskeltal Medical History: Reports: None


Skin Medical History: Reports: None


Psychiatric Medical History: Reports: None


Traumatic Medical History: Reports: None


Infectious Medical History: Reports: None





Past Surgical History


Past Surgical History: Reports: Coronary Artery Bypass Graft





Social History


Smoking Status: Never Smoker


Drugs: None


There is no history of EtOH abuse.





- Advance Directive


Resuscitation Status: Full Code.  His wife is his surrogate healthcare decision 

maker.





Family History


Family History: Reviewed & Not Pertinent


Parental Family History Reviewed: Yes


Children Family History Reviewed: Yes


Sibling(s) Family History Reviewed.: Yes





Medication/Allergy


Home Medications: 








Aspirin [Aspirin EC] 81 mg PO DAILY 10/21/17 


Carvedilol [Coreg] 25 mg PO Q12 10/21/17 


Lisinopril [Prinivil 40 mg Tablet] 20 mg PO DAILY 10/21/17 


Atorvastatin Calcium [Lipitor 80 mg Tablet] 80 mg PO QHS 01/01/20 


Ezetimibe [Zetia 10 mg Tablet] 10 mg PO DAILY 01/01/20 


Fluticasone/Vilanterol [Breo Ellipta 200-25 Mcg INH] 1 puff IH DAILY 01/01/20 


Omeprazole 20 mg PO DAILY 01/01/20 








Allergies/Adverse Reactions: 


                                        





No Known Allergies Allergy (Verified 10/21/17 15:06)





Current Medications











Generic Name Dose Route Start Last Admin





  Trade Name Freq  PRN Reason Stop Dose Admin


 


Albuterol/Ipratropium  3 ml  01/01/20 10:45 





  Duoneb 3 Ml Ampul  NEB  01/31/20 10:44 





  RTQ4HP PRN  





  SHORTNESS OF BREATH  


 


Aspirin  81 mg  01/01/20 12:00  01/01/20 13:16





  Ecotrin 81 Mg Ec Tablet  PO  01/31/20 11:59  81 mg





  DAILY MASHA   Administration


 


Atorvastatin Calcium  80 mg  01/01/20 22:00 





  Lipitor 80 Mg Tablet  PO  01/31/20 21:59 





  QHS MASHA  


 


Carvedilol  25 mg  01/01/20 22:00 





  Coreg 12.5 Mg Tablet  PO  01/31/20 21:59 





  Q12 MASHA  


 


Ezetimibe  10 mg  01/02/20 10:00 





  Zetia 10 Mg Tablet  PO  02/01/20 09:59 





  DAILY [facility]  


 


Enoxaparin Sodium  40 mg  01/01/20 12:00  01/01/20 13:16





  Lovenox Inj 40 Mg/0.4 Ml Disp.Syrin  SUBCUT  01/31/20 11:59  40 mg





  DAILY MASHA   Administration


 


Fluticasone/Vilanterol  1 inh  01/02/20 10:00 





  Breo 200-25 Mcg Ellipta 14 Dose/Dpi  IH  02/01/20 09:59 





  DAILY MASAH  


 


Lisinopril  20 mg  01/01/20 16:00  01/01/20 16:30





  Prinivil 10 Mg Tablet  PO  01/31/20 15:59  20 mg





  DAILY MASHA   Administration


 


Nitroglycerin  1 tab  01/01/20 10:42 





  Nitrostat 0.4 Mg (1/150 Gr) Tabs 25/Bottle  SL  01/31/20 10:41 





  Q5MP PRN  





  FOR CHEST PAIN  


 


Pantoprazole Sodium  40 mg  01/01/20 12:00  01/01/20 13:16





  Protonix 40 Mg Dr Tablet  PO  01/31/20 11:59  40 mg





  Q6AM MASHA   Administration














Discontinued Medications














Generic Name Dose Route Start Last Admin





  Trade Name Freq  PRN Reason Stop Dose Admin


 


Aspirin  243 mg  12/31/19 22:52  12/31/19 22:55





  Aspirin 81 Mg Chewable Tablet  PO  12/31/19 22:53  243 mg





  NOW ONE   Administration


 


Nitroglycerin  0.5 gm  01/01/20 05:33  01/01/20 06:12





  Nitrol 2% Ointment 1gm Packet  TP  01/01/20 05:34  0.5 gm





  NOW ONE   Administration











   


Review of Systems


Constitutional: ABSENT: chills, fever(s), headache(s), weight gain, weight loss


Eyes: ABSENT: visual disturbances


Ears: ABSENT: hearing changes


Nose, Mouth, and Throat: ABSENT: headache(s), mouth pain, sore throat, vertigo


Cardiovascular: ABSENT: chest pain, dyspnea on exertion, edema, orthropnea, 

palpitations


Respiratory: ABSENT: cough, hemoptysis


Gastrointestinal: PRESENT: abdominal pain - epigastric region.  He has a history

of fatty food intolerance.  ABSENT: constipation, diarrhea, hematemesis, 

hematochezia, nausea, vomiting


Genitourinary: ABSENT: dysuria, hematuria


Musculoskeletal: ABSENT: joint swelling


Integumentary: ABSENT: rash, wounds


Neurological: ABSENT: abnormal gait, abnormal speech, confusion, dizziness, 

focal weakness, syncope


Psychiatric: ABSENT: anxiety, depression, homidical ideation, suicidal ideation


Endocrine: ABSENT: cold intolerance, heat intolerance, menstrual abnormalities, 

polydipsia, polyuria


Hematologic/Lymphatic: ABSENT: easy bleeding, easy bruising, lymphadenopathy


Allergic/Immunologic: ABSENT: seasonal rhinorrhea





PHYSICAL EXAMINATION: The patient is moderately obese.  At present in no acute 

distress.  At present no chest pain and no tenderness in the epigastric region 

and no epigastric pain.


                                                                Selected Entries











  01/01/20





  19:39


 


Temperature 98.2 F


 


Temperature Oral





Source 


 


Pulse Rate 55 L


 


Respiratory 18





Rate 


 


Blood Pressure 127/64 H


 


Blood Pressure 85





Mean 


 


BP Location Left Arm


 


BP Position Supine


 


O2 Sat by Pulse 99





Oximetry 


 


Oxygen Delivery Room Air





Method 





Head: Is is atraumatic and normocephalic.  EYES: Pupils equal round regular 

reactive to light accommodation.  Extraocular movements are normal.  There is no

conjunctival pallor.  There is no scleral icterus.  Ears: Tympanic memories are 

intact.  External auditory canals are clear.  NOSE: There is no deviated nasal 

septum.  There is no inflammation of the nasal mucous membrane.  NOSE: There is 

no deviated nasal septum.  There is no inflammation nasal mucous membrane.  

MOUTH: There is no ulcers in the mouth.  There is no bleeding from the gums.  

THROAT: There is no redness of the oropharynx.  There is no exudates.  SKIN: 

There is no skin rashes or skin lesions.  There is no petechia or ecchymosis.  

NECK: Supple.  There is no JVD.  Carotids are equal there is no bruit.  There is

no lymphadenopathy.  There is no goiter.  There is no accessory muscle 

respiration use.  Trachea central.  LUNGS: Is clear to auscultation percussion 

without any rhonchi rales wheezing.  On follow-up palpation there is no chest 

wall tenderness.  HEART: S1-S2 is heard.  There is no S3 gallop.  There is no S4

gallop.  There is systolic murmur left sternal border and the apex there is no 

rub.  ABDOMEN: Is obese.  Nontender.  There is no hepatosplenomegaly.  Bowel 

sounds are well heard.  There is no tenderness or rebound guarding or rigidity. 

EXTREMITIES: Femorals are deep from.  Femorals are slightly diminished.  There 

is no femoral bruits.  Leg pulses are well felt.  There is no pedal edema.  

There is no DVT or cellulitis.  There is no calf tenderness.  There is no 

cyanosis or clubbing.  CNS: The patient is conscious awake alert oriented x3 

with no focal deficits.  PSYCHIATRIC: The patient judgment insight are intact 

his affect is normal.





The patient is EKG: [12/31/2019]: Shows sinus rhythm old inferior wall 

myocardial infarction.  Right bundle branch block pattern and left posterior 

sarah-block pattern.  No acute changes.


THE patient is EKG on 1/1/2020 shows: Sinus rhythm right bundle branch block 

pattern and left posterior hemiblock.  Ventricular trigeminy.


                               Labs- Entire Visit











  12/31/19 12/31/19 12/31/19





  22:25 22:25 22:25


 


WBC  9.3  


 


RBC  4.71  


 


Hgb  12.1 L  


 


Hct  37.2 L  


 


MCV  79 L  


 


MCH  25.8 L  


 


MCHC  32.6  


 


RDW  16.6 H  


 


Plt Count  202  


 


Lymph % (Auto)  28.3  


 


Mono % (Auto)  8.1  


 


Eos % (Auto)  1.5  


 


Baso % (Auto)  0.7  


 


Absolute Neuts (auto)  5.7  


 


Absolute Lymphs (auto)  2.6  


 


Absolute Monos (auto)  0.8  


 


Absolute Eos (auto)  0.1  


 


Absolute Basos (auto)  0.1  


 


Seg Neutrophils %  61.4  


 


Sodium   143.8 


 


Potassium   3.8 


 


Chloride   102 


 


Carbon Dioxide   35 H 


 


Anion Gap   7 


 


BUN   12 


 


Creatinine   1.28 H 


 


Est GFR ( Amer)   > 60 


 


Est GFR (MDRD) Non-Af   56 L 


 


Glucose   146 H 


 


Calcium   9.0 


 


Total Bilirubin   0.3 


 


Direct Bilirubin   0.3 


 


Neonat Total Bilirubin   Not Reportable 


 


Neonat Direct Bilirubin   Not Reportable 


 


Neonat Indirect Bili   Not Reportable 


 


AST   18 


 


ALT   15 


 


Alkaline Phosphatase   78 


 


Creatine Kinase   114 


 


CK-MB (CK-2)   


 


Troponin I    < 0.012


 


Total Protein   6.9 


 


Albumin   3.6 














  01/01/20 01/01/20 01/01/20





  04:35 04:35 11:14


 


WBC   


 


RBC   


 


Hgb   


 


Hct   


 


MCV   


 


MCH   


 


MCHC   


 


RDW   


 


Plt Count   


 


Lymph % (Auto)   


 


Mono % (Auto)   


 


Eos % (Auto)   


 


Baso % (Auto)   


 


Absolute Neuts (auto)   


 


Absolute Lymphs (auto)   


 


Absolute Monos (auto)   


 


Absolute Eos (auto)   


 


Absolute Basos (auto)   


 


Seg Neutrophils %   


 


Sodium   144.2 


 


Potassium   4.1 


 


Chloride   106 


 


Carbon Dioxide   33 H 


 


Anion Gap   5 


 


BUN   11 


 


Creatinine   1.14 


 


Est GFR ( Amer)   > 60 


 


Est GFR (MDRD) Non-Af   > 60 


 


Glucose   99 


 


Calcium   9.1 


 


Total Bilirubin   0.3 


 


Direct Bilirubin   0.2 


 


Neonat Total Bilirubin   Not Reportable 


 


Neonat Direct Bilirubin   Not Reportable 


 


Neonat Indirect Bili   Not Reportable 


 


AST   17 


 


ALT   14 


 


Alkaline Phosphatase   80 


 


Creatine Kinase    125


 


CK-MB (CK-2)   


 


Troponin I  0.049  


 


Total Protein   6.8 


 


Albumin   3.6 














  01/01/20 01/01/20 01/01/20





  11:14 18:03 18:03


 


WBC   


 


RBC   


 


Hgb   


 


Hct   


 


MCV   


 


MCH   


 


MCHC   


 


RDW   


 


Plt Count   


 


Lymph % (Auto)   


 


Mono % (Auto)   


 


Eos % (Auto)   


 


Baso % (Auto)   


 


Absolute Neuts (auto)   


 


Absolute Lymphs (auto)   


 


Absolute Monos (auto)   


 


Absolute Eos (auto)   


 


Absolute Basos (auto)   


 


Seg Neutrophils %   


 


Sodium   


 


Potassium   


 


Chloride   


 


Carbon Dioxide   


 


Anion Gap   


 


BUN   


 


Creatinine   


 


Est GFR ( Amer)   


 


Est GFR (MDRD) Non-Af   


 


Glucose   


 


Calcium   


 


Total Bilirubin   


 


Direct Bilirubin   


 


Neonat Total Bilirubin   


 


Neonat Direct Bilirubin   


 


Neonat Indirect Bili   


 


AST   


 


ALT   


 


Alkaline Phosphatase   


 


Creatine Kinase   122 


 


CK-MB (CK-2)  1.50   1.44


 


Troponin I  0.047   0.052


 


Total Protein   


 


Albumin   








                                        





Chest X-Ray  12/31/19 22:52


IMPRESSION:


 


No evidence of acute cardiopulmonary disease.


 








IMPRESSION/RECOMMENDATION:





1.  Epigastric pain atypical sounds more GI.  Will get ultrasound of the abdomen

 to make sure the patient does not have gallbladder disease.  He  does give a 

history of GERD.  Note the patient's 2 EKG does not have any acute changes.  And

 troponins are indeterminate.


2.  Coronary artery disease: Prior history of myocardial infarction.  History of

 PTCA of right coronary artery followed by history of myocardial infarction 

again recurrent with coronary artery bypass graft surgery x3 vessels as 

mentioned earlier.  His last stress test done in August 2018 showed old inferior

 wall MI but no reversible ischemia.  His LV ejection fraction was normal by 

echocardiogram.  Prior to his coronary artery bypass Surgery in 2001 his LV 

ejection fraction was 35 to 40% with three-vessel disease.  The patient's LV 

ejection fraction has improved after bypass.  Continue current anti-anginal 

treatment.


3.  Hypertension: Blood pressure well controlled.


4.  History of GERD.


5.  SYSTOLIC MURMUR: Seems to be of mitral regurgitation.?  Significance.  Will 

get an outpatient echo.  


6.  Hyperlipidemia: Continue statin.





Medications reviewed.  Medical regimen and management plan discussed with 

attending physician.  Ultrasound of the abdomen ordered.  Medical decision 

making is of moderate to high complexity.  60 minutes spent as patient more than

 50% time spent in direct patient care.  Will follow. Consult...

## 2020-01-01 NOTE — PDOC H&P
History of Present Illness


Admission Date/PCP: 


  01/01/20 05:45





  JENNYFER MENESES MD





Patient complains of: Epigastric pain


History of Present Illness: 


GENOVEVA MONTOYA is a 64 year old male patient of Dr. Meneses who presented to the ED 

with complain of recurrent epigastric pain over last couple of days. Patient 

reported that his last episode was about 2 hours before presenting to the ED. He

localized his pain to the epigastric region without radiation into his chest, 

neck, shoulder or upper extremities. His pain was relieved with self 

administration of baking soda in water with reflux event. Patient denied any 

associated palpitation, dizziness, or diaphoresis with his chest pain. His 

initial evaluation and laboratory assessment was unrevealing but his second 

Troponin I was elevated to indeterminate range. Due to his listed morbidities, 

there was concern about possible ongoing cardiac event. he was advise 

hospitalization for further evaluation and management. His morbidities are 

listed below.





Past Medical History


Cardiac Medical History: Reports: Myocardial Infarction, Hyperlipidema, 

Hypertension


Pulmonary Medical History: Reports: None


EENT Medical History: Reports: None


Neurological Medical History: Reports: None


Endocrine Medical History: Reports: Other - boderline dm


Renal/ Medical History: Reports: None


Malignancy Medical History: Reports: None


GI Medical History: Reports: Gastroesophageal Reflux Disease


Musculoskeltal Medical History: Reports: None


Skin Medical History: Reports: None


Psychiatric Medical History: Reports: None


Traumatic Medical History: Reports: None


Infectious Medical History: Reports: None





Past Surgical History


Past Surgical History: Reports: Coronary Artery Bypass Graft





Social History


Smoking Status: Never Smoker


Drugs: None





- Advance Directive


Resuscitation Status: Full Code





Family History


Family History: Reviewed & Not Pertinent


Parental Family History Reviewed: Yes


Children Family History Reviewed: Yes


Sibling(s) Family History Reviewed.: Yes





Medication/Allergy


Home Medications: 








Aspirin [Aspirin EC] 81 mg PO DAILY 10/21/17 


Carvedilol [Coreg] 25 mg PO Q12 10/21/17 


Lisinopril [Prinivil 40 mg Tablet] 20 mg PO DAILY 10/21/17 


Atorvastatin Calcium [Lipitor 80 mg Tablet] 80 mg PO QHS 01/01/20 


Ezetimibe [Zetia 10 mg Tablet] 10 mg PO DAILY 01/01/20 


Fluticasone/Vilanterol [Breo Ellipta 200-25 Mcg INH] 1 puff IH DAILY 01/01/20 


Omeprazole 20 mg PO DAILY 01/01/20 








Allergies/Adverse Reactions: 


                                        





No Known Allergies Allergy (Verified 10/21/17 15:06)


   











Review of Systems


Constitutional: ABSENT: chills, fever(s), headache(s), weight gain, weight loss


Eyes: ABSENT: visual disturbances


Ears: ABSENT: hearing changes


Nose, Mouth, and Throat: ABSENT: headache(s), mouth pain, sore throat, vertigo


Cardiovascular: ABSENT: chest pain, dyspnea on exertion, edema, orthropnea, 

palpitations


Respiratory: ABSENT: cough, hemoptysis


Gastrointestinal: PRESENT: abdominal pain - epigastric region.  ABSENT: 

constipation, diarrhea, hematemesis, hematochezia, nausea, vomiting


Genitourinary: ABSENT: dysuria, hematuria


Musculoskeletal: ABSENT: joint swelling


Integumentary: ABSENT: rash, wounds


Neurological: ABSENT: abnormal gait, abnormal speech, confusion, dizziness, 

focal weakness, syncope


Psychiatric: ABSENT: anxiety, depression, homidical ideation, suicidal ideation


Endocrine: ABSENT: cold intolerance, heat intolerance, menstrual abnormalities, 

polydipsia, polyuria


Hematologic/Lymphatic: ABSENT: easy bleeding, easy bruising, lymphadenopathy


Allergic/Immunologic: ABSENT: seasonal rhinorrhea





Physical Exam


Vital Signs: 


                                        











Temp Pulse Resp BP Pulse Ox


 


 98.9 F   58 L  16   139/71 H  97 


 


 01/01/20 12:08  01/01/20 12:58  01/01/20 12:58  01/01/20 12:08  01/01/20 12:58








                                 Intake & Output











 12/31/19 01/01/20 01/02/20





 06:59 06:59 06:59


 


Weight  113.9 kg 111.6 kg











General appearance: PRESENT: no acute distress, obese


Head exam: PRESENT: atraumatic, normocephalic


Eye exam: PRESENT: conjunctiva pink, EOMI, PERRLA.  ABSENT: scleral icterus


Ear exam: PRESENT: normal external ear exam


Mouth exam: PRESENT: moist


Neck exam: PRESENT: full ROM.  ABSENT: carotid bruit, JVD, lymphadenopathy, 

thyromegaly


Respiratory exam: PRESENT: clear to auscultation andres


Cardiovascular exam: PRESENT: RRR.  ABSENT: diastolic murmur, rubs, systolic 

murmur


Pulses: PRESENT: normal dorsalis pedis pul, +2 pedal pulses bilateral


Vascular exam: ABSENT: pallor


GI/Abdominal exam: PRESENT: normal bowel sounds, soft.  ABSENT: distended, 

guarding, mass, organolmegaly, rebound, tenderness


Rectal exam: PRESENT: deferred


Extremities exam: ABSENT: pedal edema


Musculoskeletal exam: PRESENT: normal inspection


Neurological exam: PRESENT: alert, awake, oriented to person, oriented to place,

oriented to time, oriented to situation, CN II-XII grossly intact.  ABSENT: 

motor sensory deficit


Psychiatric exam: PRESENT: appropriate affect, normal mood.  ABSENT: homicidal 

ideation, suicidal ideation


Skin exam: PRESENT: dry, warm





Results


Laboratory Results: 


                                        





                                 12/31/19 22:25 





                                 01/01/20 04:35 





                                        











  12/31/19 12/31/19 01/01/20





  22:25 22:25 04:35


 


WBC  9.3  


 


RBC  4.71  


 


Hgb  12.1 L  


 


Hct  37.2 L  


 


MCV  79 L  


 


MCH  25.8 L  


 


MCHC  32.6  


 


RDW  16.6 H  


 


Plt Count  202  


 


Seg Neutrophils %  61.4  


 


Sodium   143.8  144.2


 


Potassium   3.8  4.1


 


Chloride   102  106


 


Carbon Dioxide   35 H  33 H


 


Anion Gap   7  5


 


BUN   12  11


 


Creatinine   1.28 H  1.14


 


Est GFR ( Amer)   > 60  > 60


 


Glucose   146 H  99


 


Calcium   9.0  9.1


 


Total Bilirubin   0.3  0.3


 


AST   18  17


 


Alkaline Phosphatase   78  80


 


Total Protein   6.9  6.8


 


Albumin   3.6  3.6








                                        











  12/31/19 12/31/19 01/01/20





  22:25 22:25 04:35


 


Creatine Kinase  114  


 


CK-MB (CK-2)   


 


Troponin I   < 0.012  0.049














  01/01/20 01/01/20





  11:14 11:14


 


Creatine Kinase  125 


 


CK-MB (CK-2)   1.50


 


Troponin I   0.047











Impressions: 


                                        





Chest X-Ray  12/31/19 22:52


IMPRESSION:


 


No evidence of acute cardiopulmonary disease.


 














Assessment & Plan





- Diagnosis


(1) Elevated troponin I level


Is this a current diagnosis for this admission?: Yes   


Plan: 


See covering admitting attending physician orders for details about care plan.








(2) Epigastric pain


Is this a current diagnosis for this admission?: Yes   


Plan: 


See covering admitting attending physician orders for details about care plan.











(4) S/P CABG (coronary artery bypass graft)


Is this a current diagnosis for this admission?: Yes   


Plan: 


See covering admitting attending physician orders for details about care plan.








(5) CAD (coronary artery disease), native coronary artery


Qualifiers: 


   Native vs. transplanted heart: native heart 


Plan: 


See covering admitting attending physician orders for details about care plan.








(6) HTN (hypertension)


Qualifiers: 


   Hypertension type: essential hypertension   Qualified Code(s): I10 - 

Essential (primary) hypertension   


Is this a current diagnosis for this admission?: Yes   


Plan: 


See covering admitting attending physician orders for details about care plan.








(7) HLD (hyperlipidemia)


Qualifiers: 


   Hyperlipidemia type: unspecified   Qualified Code(s): E78.5 - Hyperlipidemia,

unspecified   


Is this a current diagnosis for this admission?: Yes   


Plan: 


See covering admitting attending physician orders for details about care plan.








(8) GERD (gastroesophageal reflux disease)


Qualifiers: 


   Esophagitis presence: esophagitis presence not specified   Qualified Code(s):

K21.9 - Gastro-esophageal reflux disease without esophagitis   


Is this a current diagnosis for this admission?: Yes   


Plan: 


See covering admitting attending physician orders for details about care plan.








- Time


Time Spent: 50 to 70 Minutes


Medications reviewed and adjusted accordingly: Yes


Anticipated discharge: Home


Within: Other





- Inpatient Certification


Based on my medical assessment, after consideration of the patient's 

comorbidities, presenting symptoms, or acuity I expect that the services needed 

warrant INPATIENT care.: Yes


I certify that my determination is in accordance with my understanding of [employer]

's requirements for reasonable and necessary INPATIENT services [42 CFR 

412.3e].: Yes


Medical Necessity: Significant Comorbidiites Make Outpatient Treatment Too 

Risky, Need Close Monitoring Due to Risk of Patient Decompensation, Need For IV 

Fluids, Need For Continuous Telemetry Monitoring, Need for Pain Control, Risk of

Complication if Not Cared For in Hospital, Risk of Diagnosis Which Will Require 

Inpatient Eval/Care/Monitoring


Post Hospital Care: D/C Planner Documentation





- Plan Summary


Plan Summary: 





See covering admitting attending physician orders for details about care plan.

## 2020-01-01 NOTE — EKG REPORT
SEVERITY:- ABNORMAL ECG -

SINUS RHYTHM

VENTRICULAR TRIGEMINY

RBBB AND LPFB

INFERIOR INFARCT, OLD, AT LEAST SINCE [id number]

:

Confirmed by: Greg Ying MD 01-Jan-2020 09:09:45

## 2020-01-02 LAB
CK MB SERPL-MCNC: 1.65 NG/ML (ref ?–4.55)
TROPONIN I SERPL-MCNC: 0.04 NG/ML

## 2020-01-02 RX ADMIN — ENOXAPARIN SODIUM SCH MG: 40 INJECTION SUBCUTANEOUS at 11:18

## 2020-01-02 RX ADMIN — PANTOPRAZOLE SODIUM SCH MG: 40 TABLET, DELAYED RELEASE ORAL at 11:17

## 2020-01-02 RX ADMIN — FLUTICASONE FUROATE AND VILANTEROL TRIFENATATE SCH INH: 200; 25 POWDER RESPIRATORY (INHALATION) at 11:19

## 2020-01-02 RX ADMIN — ASPIRIN SCH MG: 81 TABLET, COATED ORAL at 11:17

## 2020-01-02 RX ADMIN — CARVEDILOL SCH: 12.5 TABLET, FILM COATED ORAL at 21:30

## 2020-01-02 RX ADMIN — EZETIMIBE SCH MG: 10 TABLET ORAL at 11:16

## 2020-01-02 RX ADMIN — PANTOPRAZOLE SODIUM SCH MG: 40 TABLET, DELAYED RELEASE ORAL at 17:32

## 2020-01-02 RX ADMIN — ISOSORBIDE MONONITRATE SCH MG: 60 TABLET, EXTENDED RELEASE ORAL at 11:17

## 2020-01-02 RX ADMIN — LISINOPRIL SCH MG: 10 TABLET ORAL at 11:17

## 2020-01-02 RX ADMIN — PANTOPRAZOLE SODIUM SCH MG: 40 TABLET, DELAYED RELEASE ORAL at 06:18

## 2020-01-02 RX ADMIN — CARVEDILOL SCH: 12.5 TABLET, FILM COATED ORAL at 11:12

## 2020-01-02 RX ADMIN — ATORVASTATIN CALCIUM SCH MG: 80 TABLET, FILM COATED ORAL at 21:30

## 2020-01-02 NOTE — PDOC PROGRESS REPORT
Subjective


Progress Note for:: 01/02/20


Subjective:: 





Patient was admitted for the epigastric pains still having some on and off pain 

in the abdominal area but no chest pain no short of breath


Patient's was seen by the cardiology


Reason For Visit: 


EPIGASTRIC PAIN R/O ACS








Physical Exam


Vital Signs: 


                                        











Temp Pulse Resp BP Pulse Ox


 


 98.2 F   50 L  18   154/71 H  99 


 


 01/02/20 07:17  01/02/20 07:17  01/02/20 07:17  01/02/20 07:17  01/02/20 07:17








                                 Intake & Output











 01/01/20 01/02/20 01/03/20





 06:59 06:59 06:59


 


Intake Total  920 


 


Balance  920 


 


Weight 113.9 kg 111.6 kg 











General appearance: PRESENT: no acute distress, well-developed, well-nourished


Head exam: PRESENT: atraumatic, normocephalic


Eye exam: PRESENT: conjunctiva pink, EOMI, PERRLA.  ABSENT: scleral icterus


Ear exam: PRESENT: normal external ear exam


Mouth exam: PRESENT: moist, tongue midline


Neck exam: PRESENT: full ROM.  ABSENT: carotid bruit, JVD, lymphadenopathy, 

thyromegaly


Respiratory exam: PRESENT: clear to auscultation anders


Cardiovascular exam: PRESENT: RRR.  ABSENT: diastolic murmur, rubs, systolic 

murmur


Pulses: PRESENT: normal dorsalis pedis pul, +2 pedal pulses bilateral


Vascular exam: PRESENT: normal capillary refill


GI/Abdominal exam: PRESENT: normal bowel sounds, soft.  ABSENT: distended, 

guarding, mass, organolmegaly, rebound, tenderness


Rectal exam: PRESENT: deferred


Musculoskeletal exam: PRESENT: ambulatory


Neurological exam: PRESENT: alert, awake, oriented to person, oriented to place,

oriented to time, oriented to situation, CN II-XII grossly intact.  ABSENT: mo

tor sensory deficit


Psychiatric exam: PRESENT: appropriate affect, normal mood.  ABSENT: homicidal 

ideation, suicidal ideation


Skin exam: PRESENT: dry, intact, warm.  ABSENT: cyanosis, rash





Results


Laboratory Results: 


                                        





                                 12/31/19 22:25 





                                 01/01/20 04:35 





                                        











  12/31/19 12/31/19 01/01/20





  22:25 22:25 04:35


 


Creatine Kinase  114  


 


CK-MB (CK-2)   


 


Troponin I   < 0.012  0.049














  01/01/20 01/01/20 01/01/20





  11:14 11:14 18:03


 


Creatine Kinase  125   122


 


CK-MB (CK-2)   1.50 


 


Troponin I   0.047 














  01/01/20 01/02/20 01/02/20





  18:03 00:32 00:32


 


Creatine Kinase   137 


 


CK-MB (CK-2)  1.44   1.65


 


Troponin I  0.052   0.040











Impressions: 


                                        





Chest X-Ray  12/31/19 22:52


IMPRESSION:


 


No evidence of acute cardiopulmonary disease.


 














Assessment & Plan





- Diagnosis


(1) CAD (coronary artery disease), native coronary artery


Qualifiers: 


   Native vs. transplanted heart: native heart 


Is this a current diagnosis for this admission?: Yes   


Plan: 


Follow-up with the cardiology








(2) Chest pain


Qualifiers: 


   Chest pain type: unspecified   Qualified Code(s): R07.9 - Chest pain, unspe

cified   


Is this a current diagnosis for this admission?: Yes   


Plan: 


No sign of any acute coronary syndromes








(3) Epigastric pain


Is this a current diagnosis for this admission?: Yes   


Plan: 


Continues to PPI get the ultrasound of the abdomen








(4) HLD (hyperlipidemia)


Qualifiers: 


   Hyperlipidemia type: unspecified   Qualified Code(s): E78.5 - Hyperlipidemia,

unspecified   


Is this a current diagnosis for this admission?: Yes   





(5) HTN (hypertension)


Qualifiers: 


   Hypertension type: essential hypertension   Qualified Code(s): I10 - 

Essential (primary) hypertension   


Is this a current diagnosis for this admission?: Yes   





- Time


Time Spent with patient: 15-24 minutes


Level of Care: IMCU


Medications reviewed and adjusted accordingly: Yes


Anticipated discharge: Home


Within: within 24 hours





- Plan Summary


Plan Summary: 





We will get the ultrasound of the abdomen increase the PPI

## 2020-01-02 NOTE — PROGRESS NOTE
Provider Note


Provider Note: 





CARDIOLOGY PROGRESS NOTE by Dr. Luz Hess on 1/2/2020.





SUBJECTIVE: The patient has no further epigastric or lower chest very burning.  

Is abdominal ultrasound is negative for gallstones.  His abdominal CT shows no 

acute findings.  The patient has no further chest pain or discomfort.  There is 

no shortness of breath.  There is no PND orthopnea or leg edema.  There is no 

palpitations.  There is no TIA CVA symptoms.  There is no atrial or ventricular 

arrhythmia seen on the monitor.





PHYSICAL EXAMINATION: The patient is moderately obese in no acute distress.


                                                                Selected Entries











  01/02/20





  16:46


 


Temperature 98.2 F


 


Temperature Oral





Source 


 


Pulse Rate 58 L


 


Respiratory 17





Rate 


 


Blood Pressure 130/66 H


 


Blood Pressure 87





Mean 


 


BP Location Left Arm


 


BP Position Supine


 


O2 Sat by Pulse 100





Oximetry 


 


Oxygen Delivery Room Air





Method 








 Head: Is is atraumatic and normocephalic.  EYES: Pupils equal round regular 

reactive to light accommodation.  Extraocular movements are normal.  There is no

conjunctival pallor.  There is no scleral icterus.  Ears: Tympanic memories are 

intact.  External auditory canals are clear.  NOSE: There is no deviated nasal 

septum.  There is no inflammation of the nasal mucous membrane.  NOSE: There is 

no deviated nasal septum.  There is no inflammation nasal mucous membrane.  

MOUTH: There is no ulcers in the mouth.  There is no bleeding from the gums.  

THROAT: There is no redness of the oropharynx.  There is no exudates.  SKIN: 

There is no skin rashes or skin lesions.  There is no petechia or ecchymosis.  

NECK: Supple.  There is no JVD.  Carotids are equal there is no bruit.  There is

no lymphadenopathy.  There is no goiter.  There is no accessory muscle 

respiration use.  Trachea central.  LUNGS: Is clear to auscultation percussion 

without any rhonchi rales wheezing.  On follow-up palpation there is no chest 

wall tenderness.  HEART: S1-S2 is heard.  There is no S3 gallop.  There is no S4

gallop.  There is systolic murmur left sternal border and the apex there is no 

rub.  There is systolic murmur heard at the apex radiates to the left axilla.  

This seems to be mitral regurgitation murmur.?  Significance.?  Severity.  

ABDOMEN: Is obese.  Nontender.  There is no hepatosplenomegaly.  Bowel sounds 

are well heard.  There is no tenderness or rebound guarding or rigidity.  

EXTREMITIES: Femorals are deep from.  Femorals are slightly diminished.  There 

is no femoral bruits.  Leg pulses are well felt.  There is no pedal edema.  

There is no DVT or cellulitis.  There is no calf tenderness.  There is no 

cyanosis or clubbing.  CNS: The patient is conscious awake alert oriented x3 

with no focal deficits.  PSYCHIATRIC: The patient judgment insight are intact 

his affect is normal.





IMPRESSION/RECOMMENDATION:





1.  Epigastric pain atypical sounds more GI.   He  does give a history of GERD. 

Note the patient's 2 EKG does not have any acute changes.  And troponins are 

indeterminate.  And in fact are coming down.  There is no evidence of acute 

coronary syndrome.  He is abdominal ultrasound and his abdominal CT are 

negative.


2.  Coronary artery disease: Prior history of myocardial infarction.  History of

PTCA of right coronary artery followed by history of myocardial infarction again

recurrent with coronary artery bypass graft surgery x3 vessels as mentioned 

earlier.  His last stress test done in August 2018 showed old inferior wall MI 

but no reversible ischemia.  His LV ejection fraction was normal by 

echocardiogram.  Prior to his coronary artery bypass Surgery in 2001 his LV 

ejection fraction was 35 to 40% with three-vessel disease.  The patient's LV 

ejection fraction has improved after bypass.  Continue current anti-anginal 

treatment.  Will increase the patient's isosorbide mononitrate to 60 mg p.o. 

daily.  This has been discussed with the patient.  Also will get an outpatient 

IV Lexiscan Cardiolite stress test and an echocardiogram as mentioned earlier.


3.  Hypertension: Blood pressure well controlled.


4.  History of GERD.


5.  SYSTOLIC MURMUR: Seems to be of mitral regurgitation.?  Severity.  Mentioned

earlier will get an outpatient echocardiogram.


6.  Hyperlipidemia: Continue statin.





Medications reviewed.  Medical medications adjusted.  Medical regimen and 

management plan discussed with Dr. Malave.  Medical decision making is of 

moderate complexity.  40 minutes spent as patient more than 50% of time spent in

direct patient care.  Will get an outpatient IV Lexiscan Cardiolite stress test 

and echocardiogram in the office.  Cardiac status is stable.  Will sign off.  

This has been discussed with Dr. Malave.

## 2020-01-02 NOTE — RADIOLOGY REPORT (SQ)
EXAM DESCRIPTION:  CT ABD/PELVIS WITH IV   ORAL



COMPLETED DATE/TIME:  1/2/2020 2:58 pm



REASON FOR STUDY:  abd pain R10.13  EPIGASTRIC PAIN



COMPARISON:  None.



TECHNIQUE:  CT scan of the abdomen and pelvis performed with intravenous and oral contrast using latesha
allie scanning technique with dynamic intravenous contrast injection. Images reviewed with lung, soft t
issue, and bone windows. Reconstructed coronal and sagittal MPR images reviewed. Delayed images for e
valuation of the urinary system also acquired. All images stored on PACS.

All CT scanners at this facility use dose modulation, iterative reconstruction, and/or weight based d
osing when appropriate to reduce radiation dose to as low as reasonably achievable (ALARA).

[facility]: Dose Right  [facility]: CareDose    [facility]: Dose Right    [facility]: Teradose 4D    [facility]: Smart Technologies



CONTRAST TYPE AND DOSE:  contrast/concentration: Isovue 350.00 mg/ml; Total Contrast Delivered: 100.0
 ml; Total Saline Delivered: 44.3 ml



RENAL FUNCTION:  GFR > 60.



RADIATION DOSE:  CT Rad equipment meets quality standard of care and radiation dose reduction techniq
ues were employed. CTDIvol: 16.2 - 16.2 mGy. DLP: 1854 mGy-cm. .



LIMITATIONS:  None.



FINDINGS:  LOWER CHEST: No significant findings. No nodules or infiltrates.

LIVER: Normal size. No masses.  No dilated ducts.

SPLEEN: Normal size. No focal lesions.

PANCREAS: No masses. No significant calcifications. No adjacent inflammation or peripancreatic fluid 
collections. Pancreatic duct not dilated.

GALLBLADDER: No identified stones by CT criteria. No inflammatory changes to suggest cholecystitis.

ADRENAL GLANDS: No significant masses or asymmetry.

RIGHT KIDNEY AND URETER: No solid masses.   No significant calcifications.   No hydronephrosis or hyd
roureter.

LEFT KIDNEY AND URETER: No solid masses.   No significant calcifications.   No hydronephrosis or hydr
oureter.

AORTA AND VESSELS: No aneurysm.

RETROPERITONEUM: No retroperitoneal adenopathy, hemorrhage or masses.

BOWEL AND PERITONEAL CAVITY: No obstruction. No visualized masses. No free fluid.  No inflammatory ch
anges or thickening of bowel wall.

APPENDIX: Normal.

PELVIS: No significant masses.  Normal bladder. No free fluid.

ABDOMINAL WALL: Small fat containing umbilical hernia.

BONES: No significant or acute findings.

OTHER: No other significant finding.



IMPRESSION:  No acute findings.



TECHNICAL DOCUMENTATION:  JOB ID:  [id number]

Quality ID # [id number]: Final reports with documentation of one or more dose reduction techniques (e.g., Au
tomated exposure control, adjustment of the mA and/or kV according to patient size, use of iterative 
reconstruction technique)

 2011 [employer]- All Rights Reserved



Reading location - IP/workstation name: [facility]-

## 2020-01-02 NOTE — RADIOLOGY REPORT (SQ)
EXAM DESCRIPTION:  U/S ABDOMEN COMPLETE W/O DOP



COMPLETED DATE/TIME:  1/2/2020 6:03 am



REASON FOR STUDY:  Eppigastric Pain and Nausea. R10.13  EPIGASTRIC PAIN



COMPARISON:  None.



TECHNIQUE:  Dynamic and static grayscale images acquired of the abdomen and recorded on PACS. Additio
nal selected color Doppler and spectral images recorded.

Note:  Exam does not meet criteria for a complete duplex/doppler study



LIMITATIONS:  Study limited due to acoustical interference from fat or from air in the bowel.



FINDINGS:  PANCREAS: Poorly seen secondary to acoustical interference from fat or from air in the bow
el. No visualized masses.  Duct normal caliber as seen.

LIVER: Echotexture is coarse with increased echogenicity consistent with fatty infiltration.

LIVER VASCULATURE: Normal directional flow of the main portal vein and hepatic veins.

GALLBLADDER: No stones. Normal wall thickness. No pericholecystic fluid.

ULTRASOUND-DETECTED FERREIRA'S SIGN: Negative.

INTRAHEPATIC DUCTS AND COMMON DUCT: CBD and intrahepatic ducts normal caliber. No filling defects.

INFERIOR VENA CAVA: Normal flow.

AORTA: No aneurysm.

RIGHT KIDNEY:  Normal size.   Normal echogenicity.   No solid or suspicious masses.   No hydronephros
is.   No calcifications.

LEFT KIDNEY:  Normal size.   Normal echogenicity.   No solid or suspicious masses.   No hydronephrosi
s.   No calcifications.

SPLEEN:Normal size. No solid masses.

PERITONEAL AND PLEURAL SPACES: No ascites or effusions.

OTHER: No other significant finding.



IMPRESSION:  FATTY INFILTRATION OF THE LIVER. NO OTHER SIGNIFICANT FINDING IN THE VISUALIZED ABDOMEN.




TECHNICAL DOCUMENTATION:  JOB ID:  [id number]

 2011 [employer]- All Rights Reserved



Reading location - IP/workstation name: [email or web address]

## 2020-01-03 VITALS — SYSTOLIC BLOOD PRESSURE: 139 MMHG | DIASTOLIC BLOOD PRESSURE: 71 MMHG

## 2020-01-03 LAB
ADD MANUAL DIFF: NO
ALBUMIN SERPL-MCNC: 3.3 G/DL (ref 3.5–5)
ALP SERPL-CCNC: 74 U/L (ref 38–126)
ANION GAP SERPL CALC-SCNC: 7 MMOL/L (ref 5–19)
AST SERPL-CCNC: 21 U/L (ref 17–59)
BASOPHILS # BLD AUTO: 0 10^3/UL (ref 0–0.2)
BASOPHILS NFR BLD AUTO: 0.5 % (ref 0–2)
BILIRUB DIRECT SERPL-MCNC: 0.3 MG/DL (ref 0–0.4)
BILIRUB SERPL-MCNC: 0.5 MG/DL (ref 0.2–1.3)
BUN SERPL-MCNC: 9 MG/DL (ref 7–20)
CALCIUM: 9.1 MG/DL (ref 8.4–10.2)
CHLORIDE SERPL-SCNC: 106 MMOL/L (ref 98–107)
CO2 SERPL-SCNC: 28 MMOL/L (ref 22–30)
EOSINOPHIL # BLD AUTO: 0.2 10^3/UL (ref 0–0.6)
EOSINOPHIL NFR BLD AUTO: 3.2 % (ref 0–6)
ERYTHROCYTE [DISTWIDTH] IN BLOOD BY AUTOMATED COUNT: 16.6 % (ref 11.5–14)
GLUCOSE SERPL-MCNC: 118 MG/DL (ref 75–110)
HCT VFR BLD CALC: 35.3 % (ref 37.9–51)
HGB BLD-MCNC: 11.9 G/DL (ref 13.5–17)
LYMPHOCYTES # BLD AUTO: 1.7 10^3/UL (ref 0.5–4.7)
LYMPHOCYTES NFR BLD AUTO: 22.2 % (ref 13–45)
MCH RBC QN AUTO: 26 PG (ref 27–33.4)
MCHC RBC AUTO-ENTMCNC: 33.5 G/DL (ref 32–36)
MCV RBC AUTO: 78 FL (ref 80–97)
MONOCYTES # BLD AUTO: 1 10^3/UL (ref 0.1–1.4)
MONOCYTES NFR BLD AUTO: 12.9 % (ref 3–13)
NEUTROPHILS # BLD AUTO: 4.6 10^3/UL (ref 1.7–8.2)
NEUTS SEG NFR BLD AUTO: 61.2 % (ref 42–78)
PLATELET # BLD: 185 10^3/UL (ref 150–450)
POTASSIUM SERPL-SCNC: 4.1 MMOL/L (ref 3.6–5)
PROT SERPL-MCNC: 6.6 G/DL (ref 6.3–8.2)
RBC # BLD AUTO: 4.56 10^6/UL (ref 4.35–5.55)
TOTAL CELLS COUNTED % (AUTO): 100 %
WBC # BLD AUTO: 7.5 10^3/UL (ref 4–10.5)

## 2020-01-03 RX ADMIN — EZETIMIBE SCH MG: 10 TABLET ORAL at 10:04

## 2020-01-03 RX ADMIN — FLUTICASONE FUROATE AND VILANTEROL TRIFENATATE SCH INH: 200; 25 POWDER RESPIRATORY (INHALATION) at 10:03

## 2020-01-03 RX ADMIN — PANTOPRAZOLE SODIUM SCH MG: 40 TABLET, DELAYED RELEASE ORAL at 10:04

## 2020-01-03 RX ADMIN — ASPIRIN SCH MG: 81 TABLET, COATED ORAL at 10:00

## 2020-01-03 RX ADMIN — ISOSORBIDE MONONITRATE SCH MG: 60 TABLET, EXTENDED RELEASE ORAL at 10:00

## 2020-01-03 RX ADMIN — ENOXAPARIN SODIUM SCH: 40 INJECTION SUBCUTANEOUS at 10:00

## 2020-01-03 NOTE — PDOC DISCHARGE SUMMARY
Impression





- Admit/DC Date/PCP


Admission Date/Primary Care Provider: 


  01/01/20 05:45





  JENNYFER MENESES MD





Discharge Date: 01/03/20





- Discharge Diagnosis


(1) CAD (coronary artery disease), native coronary artery


Is this a current diagnosis for this admission?: Yes   





(2) Chest pain


Is this a current diagnosis for this admission?: Yes   





(3) Epigastric pain


Is this a current diagnosis for this admission?: Yes   





(4) HLD (hyperlipidemia)


Is this a current diagnosis for this admission?: Yes   





(5) HTN (hypertension)


Is this a current diagnosis for this admission?: Yes   





- Additional Information


Resuscitation Status: Full Code


Discharge Diet: Cardiac


Discharge Activity: Activity As Tolerated


Referrals: 


JENNYFER MENESES MD [Primary Care Provider] - Follow up as needed


(f/u with dr monahan


f/u with dr liang


)


Prescriptions: 


Carvedilol [Coreg 12.5 mg Tablet] 12.5 mg PO Q12 #60 tablet


Pantoprazole Sodium [Protonix 40 mg Dr Tablet] 40 mg PO DAILY #30 tablet.


Home Medications: 








Aspirin [Aspirin EC] 81 mg PO DAILY 10/21/17 


Atorvastatin Calcium [Lipitor 80 mg Tablet] 80 mg PO QHS 01/01/20 


Ezetimibe [Zetia 10 mg Tablet] 10 mg PO DAILY 01/01/20 


Fluticasone/Vilanterol [Breo Ellipta 200-25 Mcg INH] 1 puff IH DAILY 01/01/20 


Carvedilol [Coreg 12.5 mg Tablet] 12.5 mg PO Q12 #60 tablet 01/03/20 


Lisinopril [Prinivil 40 mg Tablet] 40 mg PO DAILY #30 01/03/20 


Pantoprazole Sodium [Protonix 40 mg Dr Tablet] 40 mg PO DAILY #30 tablet. 

01/03/20 











History of Present Illiness


History of Present Illness: 


GENOVEVA MONTOYA is a 64 year old male


Patient was admitted in the hospital for the chest pain epigastric pains and 

rule out acute coronary syndrome because of underlying coronary artery disease





Hospital Course


Hospital Course: 


This is a 64-year-old male admitting in the hospital for the chest pain 

epigastric pains to rule out acute coronary syndrome due to the underlying 

coronary artery disease hypertension hyperlipidemia patient initial cardiac 

enzyme was all stable no EKG change patient seen by the cardiology have a 

echocardiogram done Was all stable


Patient also underwent for the ultrasound of the abdomen and a CT abdomen and 

pelvis which no acute finding


Most likely epigastric pain GI related start the patient on a PPI patients feel 

better patients does not have any chest pain no short of breath does not have 

any abdominal pain


Heart rate was running low under 55 range and discussed with the Dr. Hess 

reduce the carvedilol dose from 25 mg to half tablet twice a day


Patient's also make appointment with GI Dr. Starks as outpatients for endoscopy


She is otherwise walking the hallway without any problems does not have any 

symptoms


His p.o. intake is good


Expressed to go home


Discuss the cardiology and suggest to follow outpatient stress test





Physical Exam


Vital Signs: 


                                        











Temp Pulse Resp BP Pulse Ox


 


 98.7 F   70   17   130/79 H  97 


 


 01/03/20 08:20  01/03/20 08:20  01/03/20 08:20  01/03/20 08:20  01/03/20 08:20








                                 Intake & Output











 01/02/20 01/03/20 01/04/20





 06:59 06:59 06:59


 


Intake Total 920 340 


 


Balance 920 340 


 


Weight 111.6 kg  











General appearance: PRESENT: no acute distress, well-developed, well-nourished


Head exam: PRESENT: atraumatic, normocephalic


Eye exam: PRESENT: conjunctiva pink, EOMI, PERRLA.  ABSENT: scleral icterus


Ear exam: PRESENT: normal external ear exam


Mouth exam: PRESENT: moist, tongue midline


Neck exam: ABSENT: carotid bruit, JVD, lymphadenopathy, thyromegaly


Respiratory exam: PRESENT: clear to auscultation andres.  ABSENT: rales, rhonchi, 

wheezes


Cardiovascular exam: PRESENT: RRR.  ABSENT: diastolic murmur, rubs, systolic 

murmur


Pulses: PRESENT: normal dorsalis pedis pul


Vascular exam: PRESENT: normal capillary refill


GI/Abdominal exam: PRESENT: normal bowel sounds, soft.  ABSENT: distended, 

guarding, mass, organolmegaly, rebound, tenderness


Rectal exam: PRESENT: deferred


Extremities exam: PRESENT: full ROM.  ABSENT: calf tenderness, clubbing, pedal 

edema


Neurological exam: PRESENT: alert, awake, oriented to person, oriented to place,

oriented to time, oriented to situation, CN II-XII grossly intact.  ABSENT: 

motor sensory deficit


Psychiatric exam: PRESENT: appropriate affect, normal mood.  ABSENT: homicidal 

ideation, suicidal ideation


Skin exam: PRESENT: dry, intact, warm.  ABSENT: cyanosis, rash





Results


Laboratory Results: 


                                        











WBC  7.5 10^3/uL (4.0-10.5)   01/03/20  04:54    


 


RBC  4.56 10^6/uL (4.35-5.55)   01/03/20  04:54    


 


Hgb  11.9 g/dL (13.5-17.0)  L  01/03/20  04:54    


 


Hct  35.3 % (37.9-51.0)  L  01/03/20  04:54    


 


MCV  78 fl (80-97)  L  01/03/20  04:54    


 


MCH  26.0 pg (27.0-33.4)  L  01/03/20  04:54    


 


MCHC  33.5 g/dL (32.0-36.0)   01/03/20  04:54    


 


RDW  16.6 % (11.5-14.0)  H  01/03/20  04:54    


 


Plt Count  185 10^3/uL (150-450)   01/03/20  04:54    


 


Lymph % (Auto)  22.2 % (13-45)   01/03/20  04:54    


 


Mono % (Auto)  12.9 % (3-13)   01/03/20  04:54    


 


Eos % (Auto)  3.2 % (0-6)   01/03/20  04:54    


 


Baso % (Auto)  0.5 % (0-2)   01/03/20  04:54    


 


Absolute Neuts (auto)  4.6 10^3/uL (1.7-8.2)   01/03/20  04:54    


 


Absolute Lymphs (auto)  1.7 10^3/uL (0.5-4.7)   01/03/20  04:54    


 


Absolute Monos (auto)  1.0 10^3/uL (0.1-1.4)   01/03/20  04:54    


 


Absolute Eos (auto)  0.2 10^3/uL (0.0-0.6)   01/03/20  04:54    


 


Absolute Basos (auto)  0.0 10^3/uL (0.0-0.2)   01/03/20  04:54    


 


Seg Neutrophils %  61.2 % (42-78)   01/03/20  04:54    


 


Sodium  140.5 mmol/L (137-145)   01/03/20  04:54    


 


Potassium  4.1 mmol/L (3.6-5.0)   01/03/20  04:54    


 


Chloride  106 mmol/L ()   01/03/20  04:54    


 


Carbon Dioxide  28 mmol/L (22-30)   01/03/20  04:54    


 


Anion Gap  7  (5-19)   01/03/20  04:54    


 


BUN  9 mg/dL (7-20)   01/03/20  04:54    


 


Creatinine  1.14 mg/dL (0.52-1.25)   01/03/20  04:54    


 


Est GFR ( Amer)  > 60  (>60)   01/03/20  04:54    


 


Est GFR (MDRD) Non-Af  > 60  (>60)   01/03/20  04:54    


 


Glucose  118 mg/dL ()  H  01/03/20  04:54    


 


Calcium  9.1 mg/dL (8.4-10.2)   01/03/20  04:54    


 


Total Bilirubin  0.5 mg/dL (0.2-1.3)   01/03/20  04:54    


 


Direct Bilirubin  0.3 mg/dL (0.0-0.4)   01/03/20  04:54    


 


Neonat Total Bilirubin  Not Reportable   01/03/20  04:54    


 


Neonat Direct Bilirubin  Not Reportable   01/03/20  04:54    


 


Neonat Indirect Bili  Not Reportable   01/03/20  04:54    


 


AST  21 U/L (17-59)   01/03/20  04:54    


 


ALT  13 U/L (<50)   01/03/20  04:54    


 


Alkaline Phosphatase  74 U/L ()   01/03/20  04:54    


 


Creatine Kinase  137 U/L ()   01/02/20  00:32    


 


CK-MB (CK-2)  1.65 ng/mL (<4.55)   01/02/20  00:32    


 


Troponin I  0.040 ng/mL  01/02/20  00:32    


 


Total Protein  6.6 g/dL (6.3-8.2)   01/03/20  04:54    


 


Albumin  3.3 g/dL (3.5-5.0)  L  01/03/20  04:54    








                                        











  12/31/19 01/01/20 01/01/20





  22:25 04:35 11:14


 


CK-MB (CK-2)    1.50


 


Troponin I  < 0.012  0.049  0.047














  01/01/20 01/02/20





  18:03 00:32


 


CK-MB (CK-2)  1.44  1.65


 


Troponin I  0.052  0.040











Impressions: 


                                        





Chest X-Ray  12/31/19 22:52


IMPRESSION:


 


No evidence of acute cardiopulmonary disease.


 








Abdomen Ultrasound  01/02/20 00:00


IMPRESSION:  FATTY INFILTRATION OF THE LIVER. NO OTHER SIGNIFICANT FINDING IN 

THE VISUALIZED ABDOMEN.


 








Abdomen/Pelvis CT  01/02/20 00:00


IMPRESSION:  No acute findings.


 














Plan


Time Spent: Greater than 30 Minutes - Follow outpatients cardiology and GI





Stroke


Is this a Stroke Patient?: No





Acute Heart Failure





- **


Is this a Heart Failure Patient?: No